# Patient Record
Sex: FEMALE | Race: WHITE | NOT HISPANIC OR LATINO | Employment: OTHER | URBAN - METROPOLITAN AREA
[De-identification: names, ages, dates, MRNs, and addresses within clinical notes are randomized per-mention and may not be internally consistent; named-entity substitution may affect disease eponyms.]

---

## 2017-01-06 ENCOUNTER — TELEPHONE (OUTPATIENT)
Dept: FAMILY MEDICINE | Facility: CLINIC | Age: 57
End: 2017-01-06

## 2017-01-06 DIAGNOSIS — N30.00 ACUTE CYSTITIS WITHOUT HEMATURIA: Primary | ICD-10-CM

## 2017-01-06 RX ORDER — SULFAMETHOXAZOLE/TRIMETHOPRIM 800-160 MG
1 TABLET ORAL 2 TIMES DAILY
Qty: 14 TABLET | Refills: 0 | Status: SHIPPED | OUTPATIENT
Start: 2017-01-06 | End: 2017-07-05

## 2017-01-06 NOTE — TELEPHONE ENCOUNTER
RN notified patient of the provider's message as it's written below.  Patient agrees and verbalized understanding.     Aleah HIDALGO RN, BSN

## 2017-01-06 NOTE — TELEPHONE ENCOUNTER
Start 7 day course of Bactrim. If symptoms do not improve or return after this course of medicine, then she will need to be seen in clinic again.    Ester Greer, CNP

## 2017-01-06 NOTE — TELEPHONE ENCOUNTER
RN spoke with patient and states she completed her antibiotic for the 3 day course and felt better for couple of days and now her symptoms are back.  Patient c/o burning and frequency and would like to get another round course of antibiotic.  Denies of blood in the urine, pelvic pain, fever, weakness, or dizziness.    Aleah HIDALGO RN, BSN

## 2017-01-06 NOTE — TELEPHONE ENCOUNTER
Reason for Call:  Medication or medication refill:    Name of the pharmacy and phone number for the current request:  [id number] - [place], [place] - [address]    Name of the medication requested: Patient doesn't remember the name.    Other request: Patient was seen on 12-21, still having symptoms and would like a refill. Was given 2 Rx's. Patient doesn't remember the names of Rx.  Can we leave a detailed message on this number? YES    Phone number patient can be reached at: Cell number on file:    Telephone Information:   Mobile [phone]       Best Time: This AM.    Call taken on 1/6/2017 at 10:06 AM by Steffanie Singh

## 2017-02-06 ENCOUNTER — TELEPHONE (OUTPATIENT)
Dept: FAMILY MEDICINE | Facility: CLINIC | Age: 57
End: 2017-02-06

## 2017-02-06 NOTE — TELEPHONE ENCOUNTER
Should follow up with ENT (Dr. Olsen) for this complaint. If she does not wish to do this, then needs OV.    Ester Greer CNP

## 2017-02-06 NOTE — TELEPHONE ENCOUNTER
Reason for Call: Request for an order or referral:    Order or referral being requested: Patient requesting referral to the [facility] for Burning mouth syndrome.  Per patient the burning is persistent     Date needed: as soon as possible    Has the patient been seen by the PCP for this problem? YES    Additional comments: Na    Phone number Patient can be reached at:  Cell number on file:    Telephone Information:   Mobile [phone]       Best Time:  anytime    Can we leave a detailed message on this number?  YES    Call taken on 2/6/2017 at 1:32 PM by Orin Couch

## 2017-03-24 NOTE — PROGRESS NOTES
SUBJECTIVE:                                                    Amalia Miranda is a 57 year old female who presents to clinic today for the following health issues:    URINARY TRACT SYMPTOMS     Onset: 1 month getting worse     Description:   Painful urination (Dysuria): YES  Blood in urine (Hematuria): no   Delay in urine (Hesitency): no     Intensity: severe    Progression of Symptoms:  worsening    Accompanying Signs & Symptoms:  Fever/chills: YES  Flank pain not sure   Nausea and vomiting: YES- nausea   Any vaginal symptoms: vaginal odor  Abdominal/Pelvic Pain: YES   History:   History of frequent UTI's: YES  History of kidney stones: no   Sexually Active: no   Possibility of pregnancy: No    Precipitating factors:            Therapies Tried and outcome: Increase fluid intake          Anxiety Follow-Up    Status since last visit: Worsened since stopping Lexapro    Other associated symptoms:None    Complicating factors:   Significant life event: Yes-  Daughter is incarcerated   Current substance abuse: None  Depression symptoms: No  COREY-7 SCORE 12/16/2011 6/16/2016 7/6/2016   Total Score 4 - -   Total Score - 0 0        GAD7     Patient would like to restart Lexapro. She felt like her anxiety was under much better control while on the medication - better able to handle life stressors. Her daughter was recently incarcerated and will be moving back in with them soon, and this has been a trigger for her. No anhedonia or low mood. No SI or thoughts of self harm.     Problem list and histories reviewed & adjusted, as indicated.  Additional history: as documented    Patient Active Problem List   Diagnosis     CARDIOVASCULAR SCREENING; LDL GOAL LESS THAN 160     Dizziness     IBS (irritable bowel syndrome)     Panic attack     Anxiety     Benzodiazepine dependence (H)     OCD (obsessive compulsive disorder)     Panic disorder with agoraphobia     Major depression in complete remission (H)     Knee pain     Health Care  Home     Distal radius fracture     Hashimoto's thyroiditis     Goiter, nontoxic, multinodular     ACP (advance care planning)     S/p bilateral carpal tunnel release     Acute right-sided low back pain with right-sided sciatica     Past Surgical History:   Procedure Laterality Date     CLOSED REDUCTION, PERCUTANEOUS PINNING WRIST, COMBINED  2/4/13    left     COLONOSCOPY  1999     ENT SURGERY  9/13/16    Lower lip bx. Dr. Olsen     RELEASE CARPAL TUNNEL Left 12/10/2015    Procedure: RELEASE CARPAL TUNNEL;  Surgeon: Elmer Cummins MD;  Location: [facility]     RELEASE CARPAL TUNNEL Right 12/31/2015    Procedure: RELEASE CARPAL TUNNEL;  Surgeon: Elmer Cummins MD;  Location: [facility] OR     SEPTOPLASTY  06/05/2000     THYROIDECTOMY  AGE 13    PARTIAL       Social History   Substance Use Topics     Smoking status: Never Smoker     Smokeless tobacco: Never Used     Alcohol use No     Family History   Problem Relation Age of Onset     Hypertension Mother      Alcohol/Drug Mother      Arthritis Mother      Depression Mother      Dementia Mother      DIABETES Father      DIABETES Maternal Grandmother      Arthritis Maternal Grandmother      Unknown/Adopted Maternal Grandfather      Unknown/Adopted Paternal Grandmother      Unknown/Adopted Paternal Grandfather      Hypertension Sister      Allergies Sister      Allergies Son      Asthma Daughter      Allergies Daughter      Respiratory Daughter      Depression Sister      Gynecology Sister      Musculoskeletal Disorder Sister      Psychotic Disorder Sister      Thyroid Disease Sister            Reviewed and updated as needed this visit by clinical staff  Tobacco  Allergies  Med Hx  Surg Hx  Fam Hx  Soc Hx      Reviewed and updated as needed this visit by Provider         ROS:  Constitutional, HEENT, cardiovascular, pulmonary, gi and gu systems are negative, except as otherwise noted.    OBJECTIVE:                                                    /64 (BP Location:  "Left arm, Patient Position: Chair, Cuff Size: Adult Regular)  Pulse 81  Temp 98.3  F (36.8  C) (Oral)  Ht 5' 7\" (1.702 m)  Wt 126 lb (57.2 kg)  SpO2 97%  Breastfeeding? No  BMI 19.73 kg/m2  Body mass index is 19.73 kg/(m^2).  GENERAL: healthy, alert and no distress  NECK: no adenopathy, no asymmetry, masses, or scars and thyroid normal to palpation  RESP: lungs clear to auscultation - no rales, rhonchi or wheezes  CV: regular rate and rhythm, normal S1 S2, no S3 or S4, no murmur, click or rub, no peripheral edema and peripheral pulses strong  ABDOMEN: soft, nontender, no hepatosplenomegaly, no masses and bowel sounds normal  MS: no gross musculoskeletal defects noted, no edema  BACK: no CVA tenderness, no paralumbar tenderness  PSYCH: mentation appears normal, affect normal/bright, judgement and insight intact, appearance well groomed and good eye contact, no psychomotor agitation or retardation     Diagnostic Test Results:  Results for orders placed or performed in visit on 03/28/17   UA reflex to Microscopic and Culture   Result Value Ref Range    Color Urine Yellow     Appearance Urine Slightly Cloudy     Glucose Urine Negative NEG mg/dL    Bilirubin Urine Negative NEG    Ketones Urine Negative NEG mg/dL    Specific Gravity Urine >1.030 1.003 - 1.035    Blood Urine Trace (A) NEG    pH Urine 5.5 5.0 - 7.0 pH    Protein Albumin Urine Negative NEG mg/dL    Urobilinogen Urine 0.2 0.2 - 1.0 EU/dL    Nitrite Urine Negative NEG    Leukocyte Esterase Urine Small (A) NEG    Source Midstream Urine    Urine Microscopic   Result Value Ref Range    WBC Urine 25-50 (A) 0 - 2 /HPF    RBC Urine 10-25 (A) 0 - 2 /HPF    Squamous Epithelial /LPF Urine Few FEW /LPF    Bacteria Urine Moderate (A) NEG /HPF    Mucous Urine Present (A) NEG /LPF   Urine Culture Aerobic Bacterial   Result Value Ref Range    Specimen Description Midstream Urine     Culture Micro (A)      >100,000 colonies/mL Non lactose fermenting gram negative " rods  10,000 to 50,000 colonies/mL urogenital radha      Micro Report Status Pending         ASSESSMENT/PLAN:                                                        ICD-10-CM    1. COREY (generalized anxiety disorder) F41.1 escitalopram (LEXAPRO) 10 MG tablet   2. Acute cystitis with hematuria N30.01 sulfamethoxazole-trimethoprim (BACTRIM DS/SEPTRA DS) 800-160 MG per tablet   3. Dysuria R30.0 UA reflex to Microscopic and Culture     Urine Microscopic   4. Nonspecific finding on examination of urine R82.90 Urine Culture Aerobic Bacterial     Will treat for UTI with Bactrim x 7 days.   Needs pelvic exam to rule out vaginal atrophy, will bring her back for this in 2 weeks.     Will restart Lexapro for anxiety since she did well with this in the past. Discussed psych ED resources at [facility] if she worsens or needs help acutely. Otherwise will see back in 2 weeks for a med check.     >50% of the visit spent in counseling regarding differential, medication side effects, and coordination of care. Visit length 28 minutes.      See Patient Instructions    Lauren A. Engelmann, MD  [facility]

## 2017-03-28 ENCOUNTER — OFFICE VISIT (OUTPATIENT)
Dept: FAMILY MEDICINE | Facility: CLINIC | Age: 57
End: 2017-03-28
Payer: COMMERCIAL

## 2017-03-28 VITALS
WEIGHT: 126 LBS | HEART RATE: 81 BPM | BODY MASS INDEX: 19.78 KG/M2 | TEMPERATURE: 98.3 F | DIASTOLIC BLOOD PRESSURE: 64 MMHG | SYSTOLIC BLOOD PRESSURE: 101 MMHG | OXYGEN SATURATION: 97 % | HEIGHT: 67 IN

## 2017-03-28 DIAGNOSIS — F41.1 GAD (GENERALIZED ANXIETY DISORDER): Primary | ICD-10-CM

## 2017-03-28 DIAGNOSIS — R82.90 NONSPECIFIC FINDING ON EXAMINATION OF URINE: ICD-10-CM

## 2017-03-28 DIAGNOSIS — R30.0 DYSURIA: ICD-10-CM

## 2017-03-28 DIAGNOSIS — N30.01 ACUTE CYSTITIS WITH HEMATURIA: ICD-10-CM

## 2017-03-28 LAB
ALBUMIN UR-MCNC: NEGATIVE MG/DL
APPEARANCE UR: ABNORMAL
BACTERIA #/AREA URNS HPF: ABNORMAL /HPF
BILIRUB UR QL STRIP: NEGATIVE
COLOR UR AUTO: YELLOW
GLUCOSE UR STRIP-MCNC: NEGATIVE MG/DL
HGB UR QL STRIP: ABNORMAL
KETONES UR STRIP-MCNC: NEGATIVE MG/DL
LEUKOCYTE ESTERASE UR QL STRIP: ABNORMAL
MUCOUS THREADS #/AREA URNS LPF: PRESENT /LPF
NITRATE UR QL: NEGATIVE
NON-SQ EPI CELLS #/AREA URNS LPF: ABNORMAL /LPF
PH UR STRIP: 5.5 PH (ref 5–7)
RBC #/AREA URNS AUTO: ABNORMAL /HPF (ref 0–2)
SP GR UR STRIP: >1.03 (ref 1–1.03)
URN SPEC COLLECT METH UR: ABNORMAL
UROBILINOGEN UR STRIP-ACNC: 0.2 EU/DL (ref 0.2–1)
WBC #/AREA URNS AUTO: ABNORMAL /HPF (ref 0–2)

## 2017-03-28 PROCEDURE — 87088 URINE BACTERIA CULTURE: CPT | Performed by: FAMILY MEDICINE

## 2017-03-28 PROCEDURE — 81001 URINALYSIS AUTO W/SCOPE: CPT | Performed by: FAMILY MEDICINE

## 2017-03-28 PROCEDURE — 87186 SC STD MICRODIL/AGAR DIL: CPT | Performed by: FAMILY MEDICINE

## 2017-03-28 PROCEDURE — 87086 URINE CULTURE/COLONY COUNT: CPT | Performed by: FAMILY MEDICINE

## 2017-03-28 PROCEDURE — 99214 OFFICE O/P EST MOD 30 MIN: CPT | Performed by: FAMILY MEDICINE

## 2017-03-28 RX ORDER — ESCITALOPRAM OXALATE 10 MG/1
10 TABLET ORAL DAILY
Qty: 30 TABLET | Refills: 1 | Status: SHIPPED | OUTPATIENT
Start: 2017-03-28 | End: 2017-08-06

## 2017-03-28 RX ORDER — SULFAMETHOXAZOLE/TRIMETHOPRIM 800-160 MG
1 TABLET ORAL 2 TIMES DAILY
Qty: 14 TABLET | Refills: 0 | Status: SHIPPED | OUTPATIENT
Start: 2017-03-28 | End: 2017-07-05

## 2017-03-28 ASSESSMENT — PAIN SCALES - GENERAL: PAINLEVEL: EXTREME PAIN (8)

## 2017-03-28 NOTE — NURSING NOTE
"Chief Complaint   Patient presents with     UTI       Initial /64 (BP Location: Left arm, Patient Position: Chair, Cuff Size: Adult Regular)  Pulse 81  Temp 98.3  F (36.8  C) (Oral)  Ht 5' 7\" (1.702 m)  Wt 126 lb (57.2 kg)  SpO2 97%  Breastfeeding? No  BMI 19.73 kg/m2 Estimated body mass index is 19.73 kg/(m^2) as calculated from the following:    Height as of this encounter: 5' 7\" (1.702 m).    Weight as of this encounter: 126 lb (57.2 kg).  Medication Reconciliation: complete   Soo Boyle CMA  "

## 2017-03-28 NOTE — PATIENT INSTRUCTIONS
I will see you in 2 weeks for a follow-up.   Please call if you have any questions in the mean time.

## 2017-03-28 NOTE — MR AVS SNAPSHOT
"              After Visit Summary   3/28/2017    Amalia Miranda    MRN: [id number]           Patient Information     Date Of Birth          1960        Visit Information        Provider Department      3/28/2017 2:40 PM Engelmann, Lauren Anneliese, MD [facility]        Today's Diagnoses     COREY (generalized anxiety disorder)    -  1    Acute cystitis with hematuria        Dysuria        Nonspecific finding on examination of urine          Care Instructions    I will see you in 2 weeks for a follow-up.   Please call if you have any questions in the mean time.        Follow-ups after your visit        Who to contact     If you have questions or need follow up information about today's clinic visit or your schedule please contact [facility] directly at [phone].  Normal or non-critical lab and imaging results will be communicated to you by MyChart, letter or phone within 4 business days after the clinic has received the results. If you do not hear from us within 7 days, please contact the clinic through MyChart or phone. If you have a critical or abnormal lab result, we will notify you by phone as soon as possible.  Submit refill requests through "[employer] or call your pharmacy and they will forward the refill request to us. Please allow 3 business days for your refill to be completed.          Additional Information About Your Visit        [email or web address] Information     "[employer] lets you send messages to your doctor, view your test results, renew your prescriptions, schedule appointments and more. To sign up, go to [email or web address] . Click on \"Log in\" on the left side of the screen, which will take you to the Welcome page. Then click on \"Sign up Now\" on the right side of the page.     You will be asked to enter the access code listed below, as well as some personal information. Please follow the directions to create your username and password.     Your access code " "is: [email or web address]  Expires: 2017  3:13 PM     Your access code will  in 90 days. If you need help or a new code, please call your [place] clinic or [phone].        Care EveryWhere ID     This is your Care EveryWhere ID. This could be used by other organizations to access your [place] medical records  [id number]        Your Vitals Were     Pulse Temperature Height Pulse Oximetry Breastfeeding? BMI (Body Mass Index)    81 98.3  F (36.8  C) (Oral) 5' 7\" (1.702 m) 97% No 19.73 kg/m2       Blood Pressure from Last 3 Encounters:   17 101/64   16 105/64   10/15/16 105/70    Weight from Last 3 Encounters:   17 126 lb (57.2 kg)   16 119 lb (54 kg)   10/15/16 113 lb 2 oz (51.3 kg)              We Performed the Following     UA reflex to Microscopic and Culture     Urine Culture Aerobic Bacterial     Urine Microscopic          Today's Medication Changes          These changes are accurate as of: 3/28/17  3:13 PM.  If you have any questions, ask your nurse or doctor.               Start taking these medicines.        Dose/Directions    escitalopram 10 MG tablet   Commonly known as:  LEXAPRO   Used for:  COREY (generalized anxiety disorder)   Started by:  Engelmann, Lauren Anneliese, MD        Dose:  10 mg   Take 1 tablet (10 mg) by mouth daily   Quantity:  30 tablet   Refills:  1         These medicines have changed or have updated prescriptions.        Dose/Directions    * sulfamethoxazole-trimethoprim 800-160 MG per tablet   Commonly known as:  BACTRIM DS/SEPTRA DS   This may have changed:  Another medication with the same name was added. Make sure you understand how and when to take each.   Used for:  Acute cystitis without hematuria   Changed by:  Ester Greer APRN CNP        Dose:  1 tablet   Take 1 tablet by mouth 2 times daily   Quantity:  14 tablet   Refills:  0       * sulfamethoxazole-trimethoprim 800-160 MG per tablet   Commonly known as:  BACTRIM DS/SEPTRA DS   This " may have changed:  You were already taking a medication with the same name, and this prescription was added. Make sure you understand how and when to take each.   Used for:  Acute cystitis with hematuria   Changed by:  Engelmann, Lauren Anneliese, MD        Dose:  1 tablet   Take 1 tablet by mouth 2 times daily   Quantity:  14 tablet   Refills:  0       * Notice:  This list has 2 medication(s) that are the same as other medications prescribed for you. Read the directions carefully, and ask your doctor or other care provider to review them with you.         Where to get your medicines      These medications were sent to [id number] - [place], [place] - [address]  [address] [address]     Phone:  [phone]     escitalopram 10 MG tablet    sulfamethoxazole-trimethoprim 800-160 MG per tablet                Primary Care Provider Office Phone # Fax #    Olvin Delvalle [phone]       [facility] [address] [facility] [address]        Thank you!     Thank you for choosing [facility]  for your care. Our goal is always to provide you with excellent care. Hearing back from our patients is one way we can continue to improve our services. Please take a few minutes to complete the written survey that you may receive in the mail after your visit with us. Thank you!             Your Updated Medication List - Protect others around you: Learn how to safely use, store and throw away your medicines at www.disposemymeds.org.          This list is accurate as of: 3/28/17  3:13 PM.  Always use your most recent med list.                   Brand Name Dispense Instructions for use    CALTRATE 600 PO      twice daily       escitalopram 10 MG tablet    LEXAPRO    30 tablet    Take 1 tablet (10 mg) by mouth daily       EXCEDRIN PO      as needed       IMODIUM A-D 2 MG Chew   Generic drug:  Loperamide HCl      every day        metroNIDAZOLE 500 MG tablet    FLAGYL    14 tablet    Take 1 tablet (500 mg) by mouth 2 times daily       omeprazole 20 MG CR capsule    priLOSEC     Take 20 mg by mouth daily       * sulfamethoxazole-trimethoprim 800-160 MG per tablet    BACTRIM DS/SEPTRA DS    14 tablet    Take 1 tablet by mouth 2 times daily       * sulfamethoxazole-trimethoprim 800-160 MG per tablet    BACTRIM DS/SEPTRA DS    14 tablet    Take 1 tablet by mouth 2 times daily       vitamin D 400 UNITS capsule      2 a day       * Notice:  This list has 2 medication(s) that are the same as other medications prescribed for you. Read the directions carefully, and ask your doctor or other care provider to review them with you.

## 2017-03-29 NOTE — PROGRESS NOTES
Results discussed in clinic. All questions already answered, but encouraged patient to call if any others arise.

## 2017-03-30 LAB
BACTERIA SPEC CULT: ABNORMAL
MICRO REPORT STATUS: ABNORMAL
MICROORGANISM SPEC CULT: ABNORMAL
SPECIMEN SOURCE: ABNORMAL

## 2017-03-30 NOTE — PROGRESS NOTES
On appropriate antibiotic regimen (TMP-SMX), labs otherwise reviewed with patient.     Lauren Engelmann, MD

## 2017-04-03 NOTE — PROGRESS NOTES
SUBJECTIVE:                                                    Amalia Miranda is a 57 year old female who presents to clinic today for the following health issues:      Concern - UTI FOLLOW UP     Onset: Ongoing since end of March    Description:   Abdominal and back pain, still burning while urinating and vaginal itching on the outside    Intensity: Moderate burning    Progression of Symptoms:  same    Accompanying Signs & Symptoms:  Dizzy, nausea, no appetite       Previous history of similar problem:   Yes    Precipitating factors:   Worsened by: Nothing    Alleviating factors:  Improved by: Nothing       Therapies Tried and outcome: Finished course of Bactrim, initially felt better. Reported worsening of symptoms and was put on Macrobid, but she only tolerated 2 doses of it so she stopped - it caused dizziness, nausea.     Reports high level of anxiety lately due to daughter's impending release from [facility] to her home.     Problem list and histories reviewed & adjusted, as indicated.  Additional history: as documented    Patient Active Problem List   Diagnosis     CARDIOVASCULAR SCREENING; LDL GOAL LESS THAN 160     Dizziness     IBS (irritable bowel syndrome)     Panic attack     Anxiety     Benzodiazepine dependence (H)     OCD (obsessive compulsive disorder)     Panic disorder with agoraphobia     Major depression in complete remission (H)     Knee pain     Health Care Home     Distal radius fracture     Hashimoto's thyroiditis     Goiter, nontoxic, multinodular     ACP (advance care planning)     S/p bilateral carpal tunnel release     Acute right-sided low back pain with right-sided sciatica     Menopausal vaginal dryness     Past Surgical History:   Procedure Laterality Date     CLOSED REDUCTION, PERCUTANEOUS PINNING WRIST, COMBINED  2/4/13    left     COLONOSCOPY  1999     ENT SURGERY  9/13/16    Lower lip bx. Dr. Olsen     RELEASE CARPAL TUNNEL Left 12/10/2015    Procedure: RELEASE CARPAL TUNNEL;   Surgeon: Elmer Cummins MD;  Location: [facility] OR     RELEASE CARPAL TUNNEL Right 12/31/2015    Procedure: RELEASE CARPAL TUNNEL;  Surgeon: Elmer Cummins MD;  Location: [facility] OR     SEPTOPLASTY  06/05/2000     THYROIDECTOMY  AGE 13    PARTIAL       Social History   Substance Use Topics     Smoking status: Never Smoker     Smokeless tobacco: Never Used     Alcohol use No     Family History   Problem Relation Age of Onset     Hypertension Mother      Alcohol/Drug Mother      Arthritis Mother      Depression Mother      Dementia Mother      DIABETES Father      DIABETES Maternal Grandmother      Arthritis Maternal Grandmother      Unknown/Adopted Maternal Grandfather      Unknown/Adopted Paternal Grandmother      Unknown/Adopted Paternal Grandfather      Hypertension Sister      Allergies Sister      Allergies Son      Asthma Daughter      Allergies Daughter      Respiratory Daughter      Depression Sister      Gynecology Sister      Musculoskeletal Disorder Sister      Psychotic Disorder Sister      Thyroid Disease Sister            Reviewed and updated as needed this visit by clinical staff  Tobacco  Allergies  Meds  Med Hx  Surg Hx  Fam Hx  Soc Hx      Reviewed and updated as needed this visit by Provider         ROS:  Constitutional, HEENT, cardiovascular, pulmonary, gi and gu systems are negative, except as otherwise noted.    OBJECTIVE:                                                    /66 (BP Location: Right arm, Patient Position: Chair, Cuff Size: Adult Regular)  Pulse 81  Temp 97.7  F (36.5  C) (Oral)  Wt 123 lb (55.8 kg)  SpO2 100%  BMI 19.26 kg/m2  Body mass index is 19.26 kg/(m^2).  GENERAL: healthy, alert and no distress  NECK: no adenopathy, no asymmetry, masses, or scars and thyroid normal to palpation  RESP: lungs clear to auscultation - no rales, rhonchi or wheezes  CV: regular rate and rhythm, normal S1 S2, no S3 or S4, no murmur, click or rub, no peripheral edema and peripheral  pulses strong  ABDOMEN: soft, nontender, no hepatosplenomegaly, no masses and bowel sounds normal   (female): normal female external genitalia, normal urethral meatus , vaginal mucosal atrophy and normal cervix, adnexae, and uterus without masses.  MS: no gross musculoskeletal defects noted, no edema  BACK: no CVA tenderness, no paralumbar tenderness    Diagnostic Test Results:  Results for orders placed or performed in visit on 04/11/17   UA reflex to Microscopic and Culture   Result Value Ref Range    Color Urine Yellow     Appearance Urine Clear     Glucose Urine Negative NEG mg/dL    Bilirubin Urine (A) NEG     Small  This is an unconfirmed screening test result. A positive result may be false.      Ketones Urine Negative NEG mg/dL    Specific Gravity Urine >1.030 1.003 - 1.035    Blood Urine Negative NEG    pH Urine 5.5 5.0 - 7.0 pH    Protein Albumin Urine Trace (A) NEG mg/dL    Urobilinogen Urine 1.0 0.2 - 1.0 EU/dL    Nitrite Urine Negative NEG    Leukocyte Esterase Urine Negative NEG    Source Midstream Urine    Urine Microscopic   Result Value Ref Range    WBC Urine O - 2 0 - 2 /HPF    RBC Urine O - 2 0 - 2 /HPF    Bacteria Urine Few (A) NEG /HPF    Calcium Oxalate Few (A) NEG /HPF   Wet prep   Result Value Ref Range    Specimen Description Vagina     Wet Prep       No Trichomonas seen  No clue cells seen  No yeast seen      Micro Report Status FINAL 04/11/2017         ASSESSMENT/PLAN:                                                        ICD-10-CM    1. Dysuria R30.0 UA reflex to Microscopic and Culture     Urine Microscopic   2. Vaginal itching L29.8 Wet prep     conjugated estrogens (PREMARIN) cream   3. Menopausal vaginal dryness N95.1      UA is clean - no evidence of recurrent UTI.   Unremarkable wet prep.   Suspect vaginal atrophy/dryness is contributing to symptoms. We will trial Premarin cream to be used weekly. Follow up if not improving in 2-3 weeks, or in 6 months if doing well for a  recheck.     See Patient Instructions    Lauren A. Engelmann, MD  [facility]

## 2017-04-06 ENCOUNTER — TELEPHONE (OUTPATIENT)
Dept: FAMILY MEDICINE | Facility: CLINIC | Age: 57
End: 2017-04-06

## 2017-04-06 DIAGNOSIS — R30.0 DYSURIA: Primary | ICD-10-CM

## 2017-04-06 RX ORDER — NITROFURANTOIN 25; 75 MG/1; MG/1
100 CAPSULE ORAL 2 TIMES DAILY
Qty: 14 CAPSULE | Refills: 0 | Status: SHIPPED | OUTPATIENT
Start: 2017-04-06 | End: 2017-07-05

## 2017-04-06 NOTE — TELEPHONE ENCOUNTER
Red flag call taken, patient was diagnosed with UTI 3/28/17 and treated, UC sensitivity done, provider felt the Rx sent should cover (Bactrim DS).  Patient states she did feel like she was improving a bit on the med, finished 2 days ago and is again having low abdominal pain, pain with urination.  Denies fever, chills, blood in urine.  Routed to provider (Engelmann to address, possible new Rx?).  Pharmacy selected.  Dr. Engelmann has left for the day.  Routed to Dr. Villatoro to address in her absence.  Basia Goyal, IMANI  [facility]

## 2017-04-06 NOTE — TELEPHONE ENCOUNTER
I called patient and advised her of new Rx sent.  She will seen Dr. Engelmann Tuesdays for follow up already scheduled.  Advised her to call if worsening symptoms such as fever, chills, blood in urine.  Patient verbalized understanding of and agreement with plan.  Basia Goyal RN  [facility]

## 2017-04-06 NOTE — TELEPHONE ENCOUNTER
Reason for call:  Patient reporting a symptom    Symptom or request: abdominal pain    Duration (how long have symptoms been present): was being treated for UTI, not getting better    Have you been treated for this before? Yes    Additional comments: IMANI [place] triaging patient.        Call taken on 4/6/2017 at 4:50 PM by Steffanie Singh

## 2017-04-11 ENCOUNTER — OFFICE VISIT (OUTPATIENT)
Dept: FAMILY MEDICINE | Facility: CLINIC | Age: 57
End: 2017-04-11
Payer: COMMERCIAL

## 2017-04-11 VITALS
WEIGHT: 123 LBS | OXYGEN SATURATION: 100 % | BODY MASS INDEX: 19.26 KG/M2 | TEMPERATURE: 97.7 F | HEART RATE: 81 BPM | SYSTOLIC BLOOD PRESSURE: 114 MMHG | DIASTOLIC BLOOD PRESSURE: 66 MMHG

## 2017-04-11 DIAGNOSIS — N89.8 VAGINAL ITCHING: ICD-10-CM

## 2017-04-11 DIAGNOSIS — N95.1 MENOPAUSAL VAGINAL DRYNESS: ICD-10-CM

## 2017-04-11 DIAGNOSIS — R30.0 DYSURIA: Primary | ICD-10-CM

## 2017-04-11 LAB
ALBUMIN UR-MCNC: ABNORMAL MG/DL
APPEARANCE UR: CLEAR
BACTERIA #/AREA URNS HPF: ABNORMAL /HPF
BILIRUB UR QL STRIP: ABNORMAL
CAOX CRY #/AREA URNS HPF: ABNORMAL /HPF
COLOR UR AUTO: YELLOW
GLUCOSE UR STRIP-MCNC: NEGATIVE MG/DL
HGB UR QL STRIP: NEGATIVE
KETONES UR STRIP-MCNC: NEGATIVE MG/DL
LEUKOCYTE ESTERASE UR QL STRIP: NEGATIVE
MICRO REPORT STATUS: NORMAL
NITRATE UR QL: NEGATIVE
PH UR STRIP: 5.5 PH (ref 5–7)
RBC #/AREA URNS AUTO: ABNORMAL /HPF (ref 0–2)
SP GR UR STRIP: >1.03 (ref 1–1.03)
SPECIMEN SOURCE: NORMAL
URN SPEC COLLECT METH UR: ABNORMAL
UROBILINOGEN UR STRIP-ACNC: 1 EU/DL (ref 0.2–1)
WBC #/AREA URNS AUTO: ABNORMAL /HPF (ref 0–2)
WET PREP SPEC: NORMAL

## 2017-04-11 PROCEDURE — 87210 SMEAR WET MOUNT SALINE/INK: CPT | Performed by: FAMILY MEDICINE

## 2017-04-11 PROCEDURE — 81001 URINALYSIS AUTO W/SCOPE: CPT | Performed by: FAMILY MEDICINE

## 2017-04-11 PROCEDURE — 99213 OFFICE O/P EST LOW 20 MIN: CPT | Performed by: FAMILY MEDICINE

## 2017-04-11 ASSESSMENT — PAIN SCALES - GENERAL: PAINLEVEL: SEVERE PAIN (6)

## 2017-04-11 NOTE — NURSING NOTE
"Chief Complaint   Patient presents with     RECHECK     other     Due for PHQ9       Initial /66 (BP Location: Right arm, Patient Position: Chair, Cuff Size: Adult Regular)  Pulse 81  Temp 97.7  F (36.5  C) (Oral)  Wt 123 lb (55.8 kg)  SpO2 100%  BMI 19.26 kg/m2 Estimated body mass index is 19.26 kg/(m^2) as calculated from the following:    Height as of 3/28/17: 5' 7\" (1.702 m).    Weight as of this encounter: 123 lb (55.8 kg).  Medication Reconciliation: complete   Bhumi Tillman MA      "

## 2017-04-11 NOTE — MR AVS SNAPSHOT
"              After Visit Summary   2017    Amalia Miranda    MRN: [id number]           Patient Information     Date Of Birth          1960        Visit Information        Provider Department      2017 3:20 PM Engelmann, Lauren Anneliese, MD [facility]        Today's Diagnoses     Dysuria    -  1    Vaginal itching          Care Instructions    Call the clinic if you have any issues with the cream.         Follow-ups after your visit        Who to contact     If you have questions or need follow up information about today's clinic visit or your schedule please contact [facility] directly at [phone].  Normal or non-critical lab and imaging results will be communicated to you by MyChart, letter or phone within 4 business days after the clinic has received the results. If you do not hear from us within 7 days, please contact the clinic through MyChart or phone. If you have a critical or abnormal lab result, we will notify you by phone as soon as possible.  Submit refill requests through [employer] or call your pharmacy and they will forward the refill request to us. Please allow 3 business days for your refill to be completed.          Additional Information About Your Visit        MyChart Information     [employer] lets you send messages to your doctor, view your test results, renew your prescriptions, schedule appointments and more. To sign up, go to [email or web address] . Click on \"Log in\" on the left side of the screen, which will take you to the Welcome page. Then click on \"Sign up Now\" on the right side of the page.     You will be asked to enter the access code listed below, as well as some personal information. Please follow the directions to create your username and password.     Your access code is: [email or web address]  Expires: 2017  3:13 PM     Your access code will  in 90 days. If you need help or a new code, please call your [place] " clinic or [phone].        Care EveryWhere ID     This is your Care EveryWhere ID. This could be used by other organizations to access your [place] medical records  [id number]        Your Vitals Were     Pulse Temperature Pulse Oximetry BMI (Body Mass Index)          81 97.7  F (36.5  C) (Oral) 100% 19.26 kg/m2         Blood Pressure from Last 3 Encounters:   04/11/17 114/66   03/28/17 101/64   12/21/16 105/64    Weight from Last 3 Encounters:   04/11/17 123 lb (55.8 kg)   03/28/17 126 lb (57.2 kg)   12/21/16 119 lb (54 kg)              We Performed the Following     UA reflex to Microscopic and Culture     Urine Microscopic     Wet prep          Today's Medication Changes          These changes are accurate as of: 4/11/17  4:10 PM.  If you have any questions, ask your nurse or doctor.               Start taking these medicines.        Dose/Directions    conjugated estrogens cream   Commonly known as:  PREMARIN   Used for:  Vaginal itching   Started by:  Engelmann, Lauren Anneliese, MD        Dose:  0.5 g   Start taking on:  4/13/2017   Place 0.5 g vaginally twice a week   Quantity:  30 g   Refills:  12            Where to get your medicines      These medications were sent to [facility]/pharmacy #[id number] - [place], [place] [address] [address]     Phone:  [phone]     conjugated estrogens cream                Primary Care Provider Office Phone # Fax #    Olvin Delvalle [phone]       [address] [facility] [address]        Thank you!     Thank you for choosing [facility]  for your care. Our goal is always to provide you with excellent care. Hearing back from our patients is one way we can continue to improve our services. Please take a few minutes to complete the written survey that you may receive in the mail after your visit with us. Thank you!             Your Updated Medication List -  Protect others around you: Learn how to safely use, store and throw away your medicines at www.disposemymeds.org.          This list is accurate as of: 4/11/17  4:10 PM.  Always use your most recent med list.                   Brand Name Dispense Instructions for use    CALTRATE 600 PO      twice daily       conjugated estrogens cream   Start taking on:  4/13/2017    PREMARIN    30 g    Place 0.5 g vaginally twice a week       escitalopram 10 MG tablet    LEXAPRO    30 tablet    Take 1 tablet (10 mg) by mouth daily       EXCEDRIN PO      as needed       IMODIUM A-D 2 MG Chew   Generic drug:  Loperamide HCl      every day       metroNIDAZOLE 500 MG tablet    FLAGYL    14 tablet    Take 1 tablet (500 mg) by mouth 2 times daily       nitrofurantoin (macrocrystal-monohydrate) 100 MG capsule    MACROBID    14 capsule    Take 1 capsule (100 mg) by mouth 2 times daily       omeprazole 20 MG CR capsule    priLOSEC     Take 20 mg by mouth daily       * sulfamethoxazole-trimethoprim 800-160 MG per tablet    BACTRIM DS/SEPTRA DS    14 tablet    Take 1 tablet by mouth 2 times daily       * sulfamethoxazole-trimethoprim 800-160 MG per tablet    BACTRIM DS/SEPTRA DS    14 tablet    Take 1 tablet by mouth 2 times daily       vitamin D 400 UNITS capsule      2 a day       * Notice:  This list has 2 medication(s) that are the same as other medications prescribed for you. Read the directions carefully, and ask your doctor or other care provider to review them with you.

## 2017-04-19 PROBLEM — N95.1 MENOPAUSAL VAGINAL DRYNESS: Status: ACTIVE | Noted: 2017-04-19

## 2017-07-05 ENCOUNTER — OFFICE VISIT (OUTPATIENT)
Dept: URGENT CARE | Facility: URGENT CARE | Age: 57
End: 2017-07-05
Payer: COMMERCIAL

## 2017-07-05 VITALS
OXYGEN SATURATION: 99 % | WEIGHT: 117 LBS | BODY MASS INDEX: 18.32 KG/M2 | TEMPERATURE: 98.2 F | HEART RATE: 84 BPM | DIASTOLIC BLOOD PRESSURE: 67 MMHG | SYSTOLIC BLOOD PRESSURE: 106 MMHG

## 2017-07-05 DIAGNOSIS — N39.0 URINARY TRACT INFECTION WITH HEMATURIA, SITE UNSPECIFIED: Primary | ICD-10-CM

## 2017-07-05 DIAGNOSIS — R31.9 URINARY TRACT INFECTION WITH HEMATURIA, SITE UNSPECIFIED: Primary | ICD-10-CM

## 2017-07-05 DIAGNOSIS — R82.90 NONSPECIFIC FINDING ON EXAMINATION OF URINE: ICD-10-CM

## 2017-07-05 DIAGNOSIS — R30.0 DYSURIA: ICD-10-CM

## 2017-07-05 LAB
ALBUMIN UR-MCNC: 30 MG/DL
APPEARANCE UR: ABNORMAL
BACTERIA #/AREA URNS HPF: ABNORMAL /HPF
BILIRUB UR QL STRIP: NEGATIVE
COLOR UR AUTO: YELLOW
GLUCOSE UR STRIP-MCNC: NEGATIVE MG/DL
HGB UR QL STRIP: ABNORMAL
KETONES UR STRIP-MCNC: ABNORMAL MG/DL
LEUKOCYTE ESTERASE UR QL STRIP: ABNORMAL
MUCOUS THREADS #/AREA URNS LPF: PRESENT /LPF
NITRATE UR QL: POSITIVE
NON-SQ EPI CELLS #/AREA URNS LPF: ABNORMAL /LPF
PH UR STRIP: 6 PH (ref 5–7)
RBC #/AREA URNS AUTO: ABNORMAL /HPF (ref 0–2)
SP GR UR STRIP: 1.02 (ref 1–1.03)
URN SPEC COLLECT METH UR: ABNORMAL
UROBILINOGEN UR STRIP-ACNC: 1 EU/DL (ref 0.2–1)
WBC #/AREA URNS AUTO: ABNORMAL /HPF (ref 0–2)

## 2017-07-05 PROCEDURE — 87186 SC STD MICRODIL/AGAR DIL: CPT | Performed by: PHYSICIAN ASSISTANT

## 2017-07-05 PROCEDURE — 87088 URINE BACTERIA CULTURE: CPT | Performed by: PHYSICIAN ASSISTANT

## 2017-07-05 PROCEDURE — 87086 URINE CULTURE/COLONY COUNT: CPT | Performed by: PHYSICIAN ASSISTANT

## 2017-07-05 PROCEDURE — 99213 OFFICE O/P EST LOW 20 MIN: CPT | Performed by: PHYSICIAN ASSISTANT

## 2017-07-05 PROCEDURE — 81001 URINALYSIS AUTO W/SCOPE: CPT | Performed by: PHYSICIAN ASSISTANT

## 2017-07-05 RX ORDER — CIPROFLOXACIN 500 MG/1
500 TABLET, FILM COATED ORAL 2 TIMES DAILY
Qty: 14 TABLET | Refills: 0 | Status: SHIPPED | OUTPATIENT
Start: 2017-07-05 | End: 2017-11-20

## 2017-07-05 NOTE — MR AVS SNAPSHOT
"              After Visit Summary   2017    Amalia Miranda    MRN: [id number]           Patient Information     Date Of Birth          1960        Visit Information        Provider Department      2017 5:10 PM Sandra Gamez PA-C [facility]        Today's Diagnoses     Urinary tract infection with hematuria, site unspecified    -  1    Dysuria        Nonspecific finding on examination of urine           Follow-ups after your visit        Who to contact     If you have questions or need follow up information about today's clinic visit or your schedule please contact [facility] directly at [phone].  Normal or non-critical lab and imaging results will be communicated to you by [employer], letter or phone within 4 business days after the clinic has received the results. If you do not hear from us within 7 days, please contact the clinic through [employer] or phone. If you have a critical or abnormal lab result, we will notify you by phone as soon as possible.  Submit refill requests through [employer] or call your pharmacy and they will forward the refill request to us. Please allow 3 business days for your refill to be completed.          Additional Information About Your Visit        MyChart Information     [employer] lets you send messages to your doctor, view your test results, renew your prescriptions, schedule appointments and more. To sign up, go to [email or web address] . Click on \"Log in\" on the left side of the screen, which will take you to the Welcome page. Then click on \"Sign up Now\" on the right side of the page.     You will be asked to enter the access code listed below, as well as some personal information. Please follow the directions to create your username and password.     Your access code is: [email or web address]  Expires: 10/4/2017  5:45 AM     Your access code will  in 90 days. If you need help or a new code, please call your [place] " clinic or [phone].        Care EveryWhere ID     This is your Care EveryWhere ID. This could be used by other organizations to access your [place] medical records  [id number]        Your Vitals Were     Pulse Temperature Pulse Oximetry Breastfeeding? BMI (Body Mass Index)       84 98.2  F (36.8  C) (Oral) 99% No 18.32 kg/m2        Blood Pressure from Last 3 Encounters:   07/05/17 106/67   04/11/17 114/66   03/28/17 101/64    Weight from Last 3 Encounters:   07/05/17 117 lb (53.1 kg)   04/11/17 123 lb (55.8 kg)   03/28/17 126 lb (57.2 kg)              We Performed the Following     UA with Microscopic reflex to Culture     Urine Culture Aerobic Bacterial          Today's Medication Changes          These changes are accurate as of: 7/5/17 11:59 PM.  If you have any questions, ask your nurse or doctor.               Start taking these medicines.        Dose/Directions    ciprofloxacin 500 MG tablet   Commonly known as:  CIPRO   Used for:  Urinary tract infection with hematuria, site unspecified   Started by:  Sandra Gamez PA-C        Dose:  500 mg   Take 1 tablet (500 mg) by mouth 2 times daily   Quantity:  14 tablet   Refills:  0            Where to get your medicines      These medications were sent to [facility] - [place], [place] - [address]  [address], [facility] [address]     Phone:  [phone]     ciprofloxacin 500 MG tablet                Primary Care Provider Office Phone # Fax #    Olvin Delvalle [phone]       [facility] [address]  [facility] [address]        Equal Access to Services     [facility]: Hadii aad ku hadasho Soomaali, waaxda luqadaha, qaybta kaalmada ademarisabelyanarciso, feli pang. So [facility] [phone].    ATENCIÓN: Si habla español, tiene a sow disposición servicios gratuitos de asistencia lingüística. Llame al [phone].    We comply with applicable  federal civil rights laws and [place] laws. We do not discriminate on the basis of race, color, national origin, age, disability sex, sexual orientation or gender identity.            Thank you!     Thank you for choosing [facility]  for your care. Our goal is always to provide you with excellent care. Hearing back from our patients is one way we can continue to improve our services. Please take a few minutes to complete the written survey that you may receive in the mail after your visit with us. Thank you!             Your Updated Medication List - Protect others around you: Learn how to safely use, store and throw away your medicines at www.disposemymeds.org.          This list is accurate as of: 7/5/17 11:59 PM.  Always use your most recent med list.                   Brand Name Dispense Instructions for use Diagnosis    CALTRATE 600 PO      twice daily        ciprofloxacin 500 MG tablet    CIPRO    14 tablet    Take 1 tablet (500 mg) by mouth 2 times daily    Urinary tract infection with hematuria, site unspecified       conjugated estrogens cream    PREMARIN    30 g    Place 0.5 g vaginally twice a week    Vaginal itching       escitalopram 10 MG tablet    LEXAPRO    30 tablet    Take 1 tablet (10 mg) by mouth daily    COREY (generalized anxiety disorder)       EXCEDRIN PO      as needed        IMODIUM A-D 2 MG Chew   Generic drug:  Loperamide HCl      every day        metroNIDAZOLE 500 MG tablet    FLAGYL    14 tablet    Take 1 tablet (500 mg) by mouth 2 times daily    BV (bacterial vaginosis)       omeprazole 20 MG CR capsule    priLOSEC     Take 20 mg by mouth daily        vitamin D 400 UNITS capsule      2 a day

## 2017-07-05 NOTE — NURSING NOTE
"Chief Complaint   Patient presents with     Urinary Problem     Pt c/o urinary problem.       Initial /67 (BP Location: Left arm, Patient Position: Chair, Cuff Size: Adult Regular)  Pulse 84  Temp 98.2  F (36.8  C) (Oral)  Wt 117 lb (53.1 kg)  SpO2 99%  Breastfeeding? No  BMI 18.32 kg/m2 Estimated body mass index is 18.32 kg/(m^2) as calculated from the following:    Height as of 3/28/17: 5' 7\" (1.702 m).    Weight as of this encounter: 117 lb (53.1 kg).  Medication Reconciliation: complete     Arpita Argueta CMA (AAMA)      "

## 2017-07-05 NOTE — PROGRESS NOTES
SUBJECTIVE:                                                    Amalia Miranda is a 57 year old female who presents to clinic today for the following health issues:    URINARY TRACT SYMPTOMS      Duration: one month for cloudy urine and odor; urinary frequency, left flank pain, and bladder pressure x 2-3 days.     Description  Cloudy urine, left flank pain, urinary frequency, bladder pressure/pain, nocturia     Intensity:  moderate    Accompanying signs and symptoms: loss of appetite x 2 days  Fever/chills: YES  Flank pain YES-left  Nausea and vomiting: YES- nausea. Vomited yesterday.   Vaginal symptoms: none  Abdominal/Pelvic Pain: YES    History  History of frequent UTI's: YES  History of kidney stones: no   Sexually Active: no   Possibility of pregnancy: No    Precipitating or alleviating factors: None    Therapies tried and outcome: Azo- had one dose this morning   Outcome: little relief.    She had an illness this past weekend with fever and chills.   No fever and chills today.  Symptoms started a couple days ago      Problem list and histories reviewed & adjusted, as indicated.  Additional history: as documented    Patient Active Problem List   Diagnosis     CARDIOVASCULAR SCREENING; LDL GOAL LESS THAN 160     Dizziness     IBS (irritable bowel syndrome)     Panic attack     Anxiety     Benzodiazepine dependence (H)     OCD (obsessive compulsive disorder)     Panic disorder with agoraphobia     Major depression in complete remission (H)     Knee pain     Health Care Home     Distal radius fracture     Hashimoto's thyroiditis     Goiter, nontoxic, multinodular     ACP (advance care planning)     S/p bilateral carpal tunnel release     Acute right-sided low back pain with right-sided sciatica     Menopausal vaginal dryness     Past Surgical History:   Procedure Laterality Date     CLOSED REDUCTION, PERCUTANEOUS PINNING WRIST, COMBINED  2/4/13    left     COLONOSCOPY  1999     ENT SURGERY  9/13/16    Lower lip  bx. Dr. Olsen     RELEASE CARPAL TUNNEL Left 12/10/2015    Procedure: RELEASE CARPAL TUNNEL;  Surgeon: Elmer Cummins MD;  Location: [facility] OR     RELEASE CARPAL TUNNEL Right 12/31/2015    Procedure: RELEASE CARPAL TUNNEL;  Surgeon: Elmer Cummins MD;  Location: [facility] OR     SEPTOPLASTY  06/05/2000     THYROIDECTOMY  AGE 13    PARTIAL       Social History   Substance Use Topics     Smoking status: Never Smoker     Smokeless tobacco: Never Used     Alcohol use No     Family History   Problem Relation Age of Onset     Hypertension Mother      Alcohol/Drug Mother      Arthritis Mother      Depression Mother      Dementia Mother      DIABETES Father      DIABETES Maternal Grandmother      Arthritis Maternal Grandmother      Unknown/Adopted Maternal Grandfather      Unknown/Adopted Paternal Grandmother      Unknown/Adopted Paternal Grandfather      Hypertension Sister      Allergies Sister      Allergies Son      Asthma Daughter      Allergies Daughter      Respiratory Daughter      Depression Sister      Gynecology Sister      Musculoskeletal Disorder Sister      Psychotic Disorder Sister      Thyroid Disease Sister            ROS:  As in HPI      OBJECTIVE:     /67 (BP Location: Left arm, Patient Position: Chair, Cuff Size: Adult Regular)  Pulse 84  Temp 98.2  F (36.8  C) (Oral)  Wt 117 lb (53.1 kg)  SpO2 99%  Breastfeeding? No  BMI 18.32 kg/m2  Body mass index is 18.32 kg/(m^2).  GENERAL: healthy, alert and no distress  NECK: no adenopathy, no asymmetry  RESP: lungs clear to auscultation - no rales, rhonchi or wheezes  CV: regular rate and rhythm, normal S1 S2, no murmur  ABDOMEN: tenderness RUQ and no palpable or pulsatile masses  NEURO: Normal strength and tone, mentation intact and speech normal  BACK: mild CVA tenderness on left, no paralumbar tenderness, no spinous process tenderness    Diagnostic Test Results:  Results for orders placed or performed in visit on 07/05/17 (from the past 24 hour(s))    UA with Microscopic reflex to Culture   Result Value Ref Range    Color Urine Yellow     Appearance Urine Slightly Cloudy     Glucose Urine Negative NEG mg/dL    Bilirubin Urine Negative NEG    Ketones Urine Trace (A) NEG mg/dL    Specific Gravity Urine 1.020 1.003 - 1.035    pH Urine 6.0 5.0 - 7.0 pH    Protein Albumin Urine 30 (A) NEG mg/dL    Urobilinogen Urine 1.0 0.2 - 1.0 EU/dL    Nitrite Urine Positive (A) NEG    Blood Urine Trace (A) NEG    Leukocyte Esterase Urine Moderate (A) NEG    Source Midstream Urine     WBC Urine  (A) 0 - 2 /HPF    RBC Urine O - 2 0 - 2 /HPF    Squamous Epithelial /LPF Urine Few FEW /LPF    Bacteria Urine Moderate (A) NEG /HPF    Mucous Urine Present (A) NEG /LPF       ASSESSMENT/PLAN:     1. Urinary tract infection with hematuria, site unspecified  - UA consistent with UTI and patient has some kidney pain so we will cover for acute pyelonephritis  - ciprofloxacin (CIPRO) 500 MG tablet; Take 1 tablet (500 mg) by mouth 2 times daily  Dispense: 14 tablet; Refill: 0    2. Dysuria  - UA with Microscopic reflex to Culture    3. Nonspecific finding on examination of urine  - Urine Culture Aerobic Bacterial    Go to ED with worsening back or abdominal pain, and with fever.      Sandra Gamez PA-C  [facility]

## 2017-08-06 ENCOUNTER — TELEPHONE (OUTPATIENT)
Dept: FAMILY MEDICINE | Facility: CLINIC | Age: 57
End: 2017-08-06

## 2017-08-06 DIAGNOSIS — F41.1 GAD (GENERALIZED ANXIETY DISORDER): ICD-10-CM

## 2017-08-06 NOTE — LETTER
Amalia,    We recently refilled your Lexapro.  We have tried to call you to see how you are doing on this medication.  Please call us back so we may discuss this.    Thank-you,    Marianne Byrne RN [facility] Triage.

## 2017-08-07 NOTE — TELEPHONE ENCOUNTER
escitalopram (LEXAPRO) 10 MG tablet     Last Written Prescription Date: 3-28-17  Last Fill Quantity: 30, # refills: 1  Last Office Visit with [facility] primary care provider:  4-11-17        Last PHQ-9 score on record=   PHQ-9 SCORE 7/6/2016   Total Score -   Total Score 1

## 2017-08-08 RX ORDER — ESCITALOPRAM OXALATE 10 MG/1
TABLET ORAL
Qty: 30 TABLET | Refills: 1 | Status: SHIPPED | OUTPATIENT
Start: 2017-08-08 | End: 2017-10-14

## 2017-08-08 NOTE — TELEPHONE ENCOUNTER
Routing refill request to provider for review/approval because:  A break in medication      Aimee Drake RN  [facility]

## 2017-08-09 NOTE — TELEPHONE ENCOUNTER
Called patient at [phone] (home) .Left message on voicemail to return phone call to triage.  Marianne Byrne RN [facility] Triage.

## 2017-08-10 NOTE — TELEPHONE ENCOUNTER
Called patient at [phone]. Left message on voicemail to return phone call to triage.  Marianne Byrne, RN [facility] Triage.

## 2017-10-14 ENCOUNTER — TELEPHONE (OUTPATIENT)
Dept: FAMILY MEDICINE | Facility: CLINIC | Age: 57
End: 2017-10-14

## 2017-10-14 DIAGNOSIS — F41.1 GAD (GENERALIZED ANXIETY DISORDER): ICD-10-CM

## 2017-10-14 NOTE — LETTER
[address] [id number] [phone]          October 18, 2017    Amalia Miranda                                                                                                                     [facility] [place] [id number]            Dear Amalia,    We have tried to contact you, but have not been able to connect with you.    We were calling to let you know that we received a refill request for a medication.    We were able to send in a supply to your pharmacy, but the provider noted that you will need to be seen for a follow up in order to continue the medication.    Please call us at [phone] if you have any questions or to schedule an appointment.    Thank you          Sincerely,         Walter Villatoro MD

## 2017-10-16 RX ORDER — ESCITALOPRAM OXALATE 10 MG/1
TABLET ORAL
Qty: 30 TABLET | Refills: 0 | Status: SHIPPED | OUTPATIENT
Start: 2017-10-16 | End: 2018-06-01 | Stop reason: DRUGHIGH

## 2017-10-16 NOTE — TELEPHONE ENCOUNTER
escitalopram (LEXAPRO) 10 MG tablet     Last Written Prescription Date: 8/8/17  Last Fill Quantity: 30, # refills: 1  Last Office Visit with [facility] primary care provider:  4/11/17   Next 5 appointments (look out 90 days)     Oct 23, 2017  9:00 AM CDT   Return Visit with Elmer Cummins MD   [facility] [place] ([facility] [place])    [address] [facility] [address]  [place] [id number] [phone]                   Last PHQ-9 score on record=   PHQ-9 SCORE 7/6/2016   Total Score -   Total Score 1

## 2017-10-16 NOTE — TELEPHONE ENCOUNTER
6 mos follow up advised at 4/11/17 visit.    Lexapro used for anxiety but does have depression on problem list.    Medication is being filled for 1 time refill only due to:  Patient needs to be seen because due for 6 mos follow up.     Encounter routed to team to reach out to patient to schedule.    Basia Goyal RN  [facility]

## 2017-10-16 NOTE — TELEPHONE ENCOUNTER
Message left on home number for patient to call back TC line.  NTBS for anxiety check with Provider.    Genoveva PAULA

## 2017-10-23 ENCOUNTER — OFFICE VISIT (OUTPATIENT)
Dept: ORTHOPEDICS | Facility: CLINIC | Age: 57
End: 2017-10-23
Payer: COMMERCIAL

## 2017-10-23 VITALS — WEIGHT: 123.4 LBS | HEIGHT: 67 IN | BODY MASS INDEX: 19.37 KG/M2 | RESPIRATION RATE: 16 BRPM

## 2017-10-23 DIAGNOSIS — M18.12 ARTHRITIS OF CARPOMETACARPAL (CMC) JOINT OF LEFT THUMB: Primary | ICD-10-CM

## 2017-10-23 PROCEDURE — 20600 DRAIN/INJ JOINT/BURSA W/O US: CPT | Mod: LT | Performed by: ORTHOPAEDIC SURGERY

## 2017-10-23 RX ORDER — TRIAMCINOLONE ACETONIDE 40 MG/ML
40 INJECTION, SUSPENSION INTRA-ARTICULAR; INTRAMUSCULAR ONCE
Qty: 1 ML | Refills: 0 | OUTPATIENT
Start: 2017-10-23 | End: 2017-10-23

## 2017-10-23 ASSESSMENT — PAIN SCALES - GENERAL: PAINLEVEL: MODERATE PAIN (5)

## 2017-10-23 NOTE — PROGRESS NOTES
The patient's left thumb carpo metacarpal joint was prepped with betadine solution after verification of allergies. Area approximately 10 cm x 10 cm prepped in a sterile fashion. After injection, betadine removed with soap and water and band-aids applied.    0.5cc Lidocaine 1% NDC 8679-0756-89, LOT [id number],  [id number]  0.5cc Kenalog 40 NDC 9762-3637-79, LOT [id number],  [id number] injected into patient's left thumb carpo metacarpal joint by:   Tyron Estrada PA-C, [facility] (Ortho)  Supervising Physician: Elmer Cummins M.D., M.S.  Dept. of Orthopaedic Surgery  [facility]

## 2017-10-23 NOTE — PROGRESS NOTES
Chief Complaint   Patient presents with     RECHECK     WORK COMP. Bilateral CTR - left: 12/10/15, right: 12/26/14. Patient states her wrists are doing pretty good. She still doesn't have strength in her left wrist but she works with it. She knows she has some arthritis and that is what is bothering her at this time. She would like a left wrist injection today.        HPI: Amalia Miranda is a 57 year old female , left -hand dominant, who presents for recurrent left wrist/thumb pain.  She returns today for a repeat left thumb injection. Her last injection was on 3/28/2016 at the carpometacarpal joint. This injection worked great for a long while. Her thumb has been bothering her for some time now. Her mother had fallen and she was really unable to assist her because of the pain in her wrists/thumbs. Has known carpometacarpal arthritis.      PAST MEDICAL HISTORY:   Past Medical History:   Diagnosis Date     Allergies      Anxiety      Depression      Deviated septum 2000     GERD (gastroesophageal reflux disease)      Goiter, nontoxic, multinodular      Hemorrhoids      IBS (irritable bowel syndrome)      OCD (obsessive compulsive disorder)      Stress headaches      Thyroid nodule     2005     Patient Active Problem List    Diagnosis Date Noted     Health Care Home 01/11/2012     Priority: High     X    DX V65.8 REPLACED WITH 57[facility] C[facility] HOME (04/08/2013)       Menopausal vaginal dryness 04/19/2017     Priority: Medium     Acute right-sided low back pain with right-sided sciatica 06/21/2016     Priority: Medium     S/p bilateral carpal tunnel release 01/14/2016     Priority: Medium     ACP (advance care planning) 12/14/2015     Priority: Medium     Advance Care Planning 12/14/2015: ACP Review of Chart / Resources Provided:  Reviewed chart for advance care plan.  Amalia Miranda has no plan or code status on file. Discussed available resources and provided with information. Confirmed code status reflects current  choices pending further ACP discussions.  Confirmed/documented legally designated decision maker(s). Added by Danette Vizcaino             Hashimoto's thyroiditis 08/29/2013     Priority: Medium     Followed by ENDO in 2011        Goiter, nontoxic, multinodular      Priority: Medium     Distal radius fracture 01/31/2013     Priority: Medium     Knee pain 12/13/2011     Priority: Medium     Major depression in complete remission (H) 09/01/2011     Priority: Medium     Panic disorder with agoraphobia 04/28/2011     Priority: Medium     Dizziness 11/03/2010     Priority: Medium     IBS (irritable bowel syndrome) 11/03/2010     Priority: Medium     Panic attack 11/03/2010     Priority: Medium     Anxiety 11/03/2010     Priority: Medium     Benzodiazepine dependence (H) 11/03/2010     Priority: Medium     OCD (obsessive compulsive disorder) 11/03/2010     Priority: Medium     CARDIOVASCULAR SCREENING; LDL GOAL LESS THAN 160 10/31/2010     Priority: Medium       PAST SURGICAL HISTORY:   Past Surgical History:   Procedure Laterality Date     CLOSED REDUCTION, PERCUTANEOUS PINNING WRIST, COMBINED  2/4/13    left     COLONOSCOPY  1999     ENT SURGERY  9/13/16    Lower lip bx. Dr. Olsen     RELEASE CARPAL TUNNEL Left 12/10/2015    Procedure: RELEASE CARPAL TUNNEL;  Surgeon: Elmer Cummins MD;  Location: [facility]     RELEASE CARPAL TUNNEL Right 12/31/2015    Procedure: RELEASE CARPAL TUNNEL;  Surgeon: Elmer Cummins MD;  Location: [facility] OR     SEPTOPLASTY  06/05/2000     THYROIDECTOMY  AGE 13    PARTIAL       MEDICATIONS:    Current Outpatient Prescriptions   Medication Sig Dispense Refill     escitalopram (LEXAPRO) 10 MG tablet TAKE 1 TABLET (10 MG) BY MOUTH DAILY 30 tablet 0     conjugated estrogens (PREMARIN) cream Place 0.5 g vaginally twice a week 30 g 12     metroNIDAZOLE (FLAGYL) 500 MG tablet Take 1 tablet (500 mg) by mouth 2 times daily 14 tablet 0     omeprazole (PRILOSEC) 20 MG capsule Take 20 mg by mouth daily   "3     EXCEDRIN OR as needed       IMODIUM A-D 2 MG OR CHEW every day       CALTRATE 600 OR twice daily       VITAMIN D 400 UNIT OR CAPS 2 a day       ciprofloxacin (CIPRO) 500 MG tablet Take 1 tablet (500 mg) by mouth 2 times daily (Patient not taking: Reported on 10/23/2017) 14 tablet 0        ALLERGIES: No Known Allergies    ROS:   Denies numbness, tingling, parasthesias.   Denies headaches.   Denies fevers, chills, night sweats   Denies chest pain.   Denies shortness of breath.   Denies any skin problems, abrasions, rashes, irritation.     This document serves as a record of the services and decisions personally performed and made by Elmer Cummins MD. It was created on his behalf by Padmnii Maki, a trained medical scribe. The creation of this document is based the provider's statements to the medical scribe.    Scribsuki Maki 9:07 AM 10/23/2017       PHYSICAL EXAM  GENERAL APPEARANCE: healthy, alert, no distress.   SKIN: no suspicious lesions or rashes  RESPIRATORY: No increased work of breathing.  NEURO: Normal strength and tone, mentation intact and speech normal  PSYCH:  mentation appears normal and affect normal/bright. Not anxious.      Resp 16  Ht 1.702 m (5' 7\")  Wt 56 kg (123 lb 6.4 oz)  BMI 19.33 kg/m2     RIGHT HAND / WRIST EXAM:     Volar incision healed well.  No erythema. No drainage.    There is no swelling in the surgical area.  There is no tenderness in over the incision.  There is no ecchymosis.  There is no erythema of the surrounding skin.  There is no maceration of the skin.  There is no deformity in the area.  Range of motion: within normal limits.  Mild right carpometacarpal grind, nontender to palpation.    Intact sensation to light touch in the distribution of the median, radial, and ulnar nerves of the hand. Intact sensation of the radial and ulnar digital nerves of the thumb, index, long (middle), ring and small finger.  Brisk capillary refill to all fingers, warm and well-perfused. "   Palpable radial pulse.    LEFT HAND / WRIST EXAM:     Volar incision healed well.  There is no tenderness in the wrist or hand. There is tender to palpation thumb base at carpometacarpal joint.  Mild-moderate CMC grind of the thumb. Mild discomfort with manipulation of left thumb.   There is no ecchymosis.  There is no erythema of the surrounding skin.  There is no maceration of the skin.  There is no deformity in the area.  Range of motion: within normal limits.   strength good, near symmetric to the right.   Range of motion normal, symmetric to the right.    Intact sensation to light touch in the distribution of the median, radial, and ulnar nerves of the hand. Intact sensation of the radial and ulnar digital nerves of the thumb, index, long (middle), ring and small finger.  Brisk capillary refill to all fingers, warm and well-perfused.   Palpable radial pulse.      ASSESSMENT: 57 year old female with left thumb pain, left carpometacarpal basilar thumb arthritis.       PLAN:   * reviewed exam findings and xrays with patient. Consistent with carpometacarpal basilar thumb arthritis  * treatment options discussed  * rest  * ice  * NSAIDS  * activity modification  * thumb based splint immobilization with activities (refer to Hand Therapy/OT for splint, strengthening, rom)  * cortisone injections  * lastly surgery (LRTI, anchovy procedure versus fusion)  * return to clinic as needed.    PROCEDURE NOTE:  The risks, perceived benefits and potential complications (including but not limited to: bleeding, infection, pain, scar, damage to adjacent structures, atrophy or necrosis of soft tissue, skin blanching, failure to relieve symptoms, worsening of symptoms, allergic reaction) of injection were discussed with the patient. Questions were addressed and answered.The patient elected to proceed. Written informed consent was obtained. The correct procedural site was identified and confirmed. A LEFT thumb 1st CMC injection  was performed using .5 cc  Kenalog-40 40mg per mL and .5 cc of local anesthetic after sterile prep, to the correct procedural site. Sterile bandaid applied. This was tolerated well by the patient. No apparent complications. Did also discuss that if diabetic, recommend close monitoring of blood sugars over the next week as cortisone injections can temporarily elevate blood sugars.       The information in this document, created by a scribe for me, accurately reflects the services I personally performed and the decisions made by me. I have reviewed and approved this document for accuracy.      Elmer Cummins M.D., M.S.  Dept. of Orthopaedic Surgery  [facility]

## 2017-10-23 NOTE — PATIENT INSTRUCTIONS
Please remember to call and schedule a follow up appointment if one was recommended at your earliest convenience.  Orthopedics CLINIC HOURS TELEPHONE NUMBER   Dr. Placido Hui  Certified Medical Assistant   Monday & Wednesday   8am - 5pm  Thursday 1pm - 5pm  Friday 8am -11:30am Specialty schedulers:   [phone] to schedule your surgery.  Main Clinic:   [phone] to make an appointment with any provider.    Urgent Care locations:    [facility] Monday-Friday Closed  Saturday-Sunday 9am-5pm      Monday-Friday 12pm - 8pm  Saturday-Sunday 9am-5pm [phone]     If SURGERY has been recommended, please call our Specialty Schedulers at [phone] to schedule your procedure.    If you need a medication refill, please contact your pharmacy. Please allow 3 business days for your refill to be completed.    If an MRI or CT scan has been recommended, please call [facility] Schedulers at [phone] to schedule your appointment.  Use [employer] (secure e-mail communication and access to your chart) to send a message or to make an appointment. Please ask how you can sign up for [employer].  Your care team's suggested websites for health information:   [email or web address] : Up to date and easily searchable information on multiple topics.   [email or web address] : [place] dept of heat, public health issues in [place], N1N1

## 2017-10-23 NOTE — NURSING NOTE
"Chief Complaint   Patient presents with     RECHECK     WORK COMP. Bilateral CTR - left: 12/10/15, right: 12/26/14. Patient states her wrists are doing pretty good. She still doesn't have strength in her left wrist but she works with it. She knows she has some arthritis and that is what is bothering her at this time. She would like a left wrist injection today.        Initial Resp 16  Ht 1.702 m (5' 7\")  Wt 56 kg (123 lb 6.4 oz)  BMI 19.33 kg/m2 Estimated body mass index is 19.33 kg/(m^2) as calculated from the following:    Height as of this encounter: 1.702 m (5' 7\").    Weight as of this encounter: 56 kg (123 lb 6.4 oz).  Medication Reconciliation: alex Barrett Certified Medical Assistant    "

## 2017-11-15 DIAGNOSIS — F41.1 GAD (GENERALIZED ANXIETY DISORDER): ICD-10-CM

## 2017-11-16 RX ORDER — ESCITALOPRAM OXALATE 10 MG/1
TABLET ORAL
Qty: 30 TABLET | Refills: 0 | OUTPATIENT
Start: 2017-11-16

## 2017-11-16 NOTE — TELEPHONE ENCOUNTER
Back on 8/6 and 10/14 pt was sent letters to call to make apts. Called pt and she made an apt for Monday with Dr engelmann. Pt states that she is taking 1/2 a pill at this time and it is working. Will deny refill and have them discuss on Monday what best dose would be for pt. Tiny Brito RN-BSN

## 2017-11-20 ENCOUNTER — OFFICE VISIT (OUTPATIENT)
Dept: FAMILY MEDICINE | Facility: CLINIC | Age: 57
End: 2017-11-20
Payer: COMMERCIAL

## 2017-11-20 VITALS
BODY MASS INDEX: 19.26 KG/M2 | SYSTOLIC BLOOD PRESSURE: 100 MMHG | DIASTOLIC BLOOD PRESSURE: 63 MMHG | HEART RATE: 73 BPM | TEMPERATURE: 97.2 F | OXYGEN SATURATION: 100 % | WEIGHT: 123 LBS

## 2017-11-20 DIAGNOSIS — Z12.11 SPECIAL SCREENING FOR MALIGNANT NEOPLASMS, COLON: ICD-10-CM

## 2017-11-20 DIAGNOSIS — F32.5 MAJOR DEPRESSION IN COMPLETE REMISSION (H): ICD-10-CM

## 2017-11-20 DIAGNOSIS — F41.1 GAD (GENERALIZED ANXIETY DISORDER): Primary | ICD-10-CM

## 2017-11-20 PROCEDURE — 99213 OFFICE O/P EST LOW 20 MIN: CPT | Performed by: FAMILY MEDICINE

## 2017-11-20 RX ORDER — ESCITALOPRAM OXALATE 5 MG/1
5 TABLET ORAL DAILY
Qty: 90 TABLET | Refills: 1 | Status: SHIPPED | OUTPATIENT
Start: 2017-11-20 | End: 2018-05-22

## 2017-11-20 ASSESSMENT — ANXIETY QUESTIONNAIRES
IF YOU CHECKED OFF ANY PROBLEMS ON THIS QUESTIONNAIRE, HOW DIFFICULT HAVE THESE PROBLEMS MADE IT FOR YOU TO DO YOUR WORK, TAKE CARE OF THINGS AT HOME, OR GET ALONG WITH OTHER PEOPLE: SOMEWHAT DIFFICULT
1. FEELING NERVOUS, ANXIOUS, OR ON EDGE: SEVERAL DAYS
6. BECOMING EASILY ANNOYED OR IRRITABLE: NOT AT ALL
3. WORRYING TOO MUCH ABOUT DIFFERENT THINGS: NOT AT ALL
GAD7 TOTAL SCORE: 2
7. FEELING AFRAID AS IF SOMETHING AWFUL MIGHT HAPPEN: NOT AT ALL
5. BEING SO RESTLESS THAT IT IS HARD TO SIT STILL: SEVERAL DAYS
2. NOT BEING ABLE TO STOP OR CONTROL WORRYING: NOT AT ALL

## 2017-11-20 ASSESSMENT — PATIENT HEALTH QUESTIONNAIRE - PHQ9
5. POOR APPETITE OR OVEREATING: NOT AT ALL
SUM OF ALL RESPONSES TO PHQ QUESTIONS 1-9: 3

## 2017-11-20 ASSESSMENT — PAIN SCALES - GENERAL: PAINLEVEL: NO PAIN (0)

## 2017-11-20 NOTE — PROGRESS NOTES
SUBJECTIVE:   Amalia Miranda is a 57 year old female who presents to clinic today for the following health issues:    Anxiety Follow-Up    Status since last visit: Improved- sleeping better, does not wake up anxious     Other associated symptoms:None    Complicating factors:   Significant life event: Mother is sick and daughter is an alcoholic   Current substance abuse: None  Depression symptoms: No  COREY-7 SCORE 6/16/2016 7/6/2016 11/20/2017   Total Score - - -   Total Score 0 0 2       GAD7      Amount of exercise or physical activity: Walking    Problems taking medications regularly: No    Medication side effects: none    Diet: regular (no restrictions)    PHQ-9 SCORE 6/16/2016 7/6/2016 11/20/2017   Total Score - - -   Total Score 0 1 3     Has been taking 5mg of escitalopram and thinks it's more helpful than 10mg.     Due for colon cancer screening, does not want another colonoscopy. Amenable to FIT.     Problem list and histories reviewed & adjusted, as indicated.  Additional history: as documented    Patient Active Problem List   Diagnosis     CARDIOVASCULAR SCREENING; LDL GOAL LESS THAN 160     Dizziness     IBS (irritable bowel syndrome)     Panic attack     Anxiety     Benzodiazepine dependence (H)     OCD (obsessive compulsive disorder)     Panic disorder with agoraphobia     Major depression in complete remission (H)     Knee pain     Health Care Home     Distal radius fracture     Hashimoto's thyroiditis     Goiter, nontoxic, multinodular     ACP (advance care planning)     S/p bilateral carpal tunnel release     Acute right-sided low back pain with right-sided sciatica     Menopausal vaginal dryness     Past Surgical History:   Procedure Laterality Date     CLOSED REDUCTION, PERCUTANEOUS PINNING WRIST, COMBINED  2/4/13    left     COLONOSCOPY  1999     ENT SURGERY  9/13/16    Lower lip bx. Dr. Olsen     RELEASE CARPAL TUNNEL Left 12/10/2015    Procedure: RELEASE CARPAL TUNNEL;  Surgeon: Elmer Cummins  MD Rober;  Location: [facility] OR     RELEASE CARPAL TUNNEL Right 12/31/2015    Procedure: RELEASE CARPAL TUNNEL;  Surgeon: Elmer Cummins MD;  Location: [facility] OR     SEPTOPLASTY  06/05/2000     THYROIDECTOMY  AGE 13    PARTIAL       Social History   Substance Use Topics     Smoking status: Never Smoker     Smokeless tobacco: Never Used     Alcohol use No     Family History   Problem Relation Age of Onset     Hypertension Mother      Alcohol/Drug Mother      Arthritis Mother      Depression Mother      Dementia Mother      DIABETES Father      DIABETES Maternal Grandmother      Arthritis Maternal Grandmother      Unknown/Adopted Maternal Grandfather      Unknown/Adopted Paternal Grandmother      Unknown/Adopted Paternal Grandfather      Hypertension Sister      Allergies Sister      Allergies Son      Asthma Daughter      Allergies Daughter      Respiratory Daughter      Depression Sister      Gynecology Sister      Musculoskeletal Disorder Sister      Psychotic Disorder Sister      Thyroid Disease Sister              Reviewed and updated as needed this visit by clinical staffTobacco  Allergies  Meds  Med Hx  Surg Hx  Fam Hx  Soc Hx      Reviewed and updated as needed this visit by Provider         ROS:  Constitutional, HEENT, cardiovascular, pulmonary, gi and gu systems are negative, except as otherwise noted.      OBJECTIVE:   /63 (BP Location: Right arm, Patient Position: Chair, Cuff Size: Adult Regular)  Pulse 73  Temp 97.2  F (36.2  C) (Axillary)  Wt 123 lb (55.8 kg)  SpO2 100%  BMI 19.26 kg/m2  Body mass index is 19.26 kg/(m^2).  GENERAL: healthy, alert and no distress  NECK: no adenopathy, no asymmetry, masses, or scars and thyroid normal to palpation  RESP: lungs clear to auscultation - no rales, rhonchi or wheezes  CV: regular rate and rhythm, normal S1 S2, no S3 or S4, no murmur, click or rub, no peripheral edema and peripheral pulses strong  MS: no gross musculoskeletal defects noted, no  "edema  PSYCH: MENTAL STATUS EXAM  Appearance: appropriate, well-groomed   Attitude: cooperative, engaged in conversation   Behavior: normal, no psychomotor agitation or retardation   Eye Contact: normal  Speech: normal jana, normal content, normal volume   Orientation: oriented to person, place, time and situation  Mood: \"doing ok\"  Affect: bright, mood-congruent   Thought Process: linear   Suicidal Ideation: No passive or active SI or plan, no thoughts of self-harm, reports children as protective factor   Hallucination: no A or V hallucinations    Diagnostic Test Results:  none     ASSESSMENT/PLAN:       ICD-10-CM    1. COREY (generalized anxiety disorder) F41.1 escitalopram (LEXAPRO) 5 MG tablet   2. Major depression in complete remission (H) F32.5    3. Special screening for malignant neoplasms, colon Z12.11 Fecal colorectal cancer screen (FIT)     Ok to continue 5mg tabs as she is getting relief from that dose.   FIT card discussed and provided to patient.     See Patient Instructions    Lauren A. Engelmann, MD  [facility]    "

## 2017-11-20 NOTE — LETTER
My Depression Action Plan  Name: Amalia Miranda   Date of Birth 1960  Date: 11/20/2017    My doctor: Remi [place], [facility]   My clinic: [address] [id number] [phone]          GREEN    ZONE   Good Control    What it looks like:     Things are going generally well. You have normal up s and down s. You may even feel depressed from time to time, but bad moods usually last less than a day.   What you need to do:  1. Continue to care for yourself (see self care plan)  2. Check your depression survival kit and update it as needed  3. Follow your physician s recommendations including any medication.  4. Do not stop taking medication unless you consult with your physician first.           YELLOW         ZONE Getting Worse    What it looks like:     Depression is starting to interfere with your life.     It may be hard to get out of bed; you may be starting to isolate yourself from others.    Symptoms of depression are starting to last most all day and this has happened for several days.     You may have suicidal thoughts but they are not constant.   What you need to do:     1. Call your care team, your response to treatment will improve if you keep your care team informed of your progress. Yellow periods are signs an adjustment may need to be made.     2. Continue your self-care, even if you have to fake it!    3. Talk to someone in your support network    4. Open up your depression survival kit           RED    ZONE Medical Alert - Get Help    What it looks like:     Depression is seriously interfering with your life.     You may experience these or other symptoms: You can t get out of bed most days, can t work or engage in other necessary activities, you have trouble taking care of basic hygiene, or basic responsibilities, thoughts of suicide or death that will not go away, self-injurious behavior.     What you need to  do:  1. Call your care team and request a same-day appointment. If they are not available (weekends or after hours) call your local crisis line, emergency room or 911.      Electronically signed by: Lauren A. Engelmann, November 20, 2017    Depression Self Care Plan / Survival Kit    Self-Care for Depression  Here s the deal. Your body and mind are really not as separate as most people think.  What you do and think affects how you feel and how you feel influences what you do and think. This means if you do things that people who feel good do, it will help you feel better.  Sometimes this is all it takes.  There is also a place for medication and therapy depending on how severe your depression is, so be sure to consult with your medical provider and/ or Behavioral Health Consultant if your symptoms are worsening or not improving.     In order to better manage my stress, I will:    Exercise  Get some form of exercise, every day. This will help reduce pain and release endorphins, the  feel good  chemicals in your brain. This is almost as good as taking antidepressants!  This is not the same as joining a gym and then never going! (they count on that by the way ) It can be as simple as just going for a walk or doing some gardening, anything that will get you moving.      Hygiene   Maintain good hygiene (Get out of bed in the morning, Make your bed, Brush your teeth, Take a shower, and Get dressed like you were going to work, even if you are unemployed).  If your clothes don't fit try to get ones that do.    Diet  I will strive to eat foods that are good for me, drink plenty of water, and avoid excessive sugar, caffeine, alcohol, and other mood-altering substances.  Some foods that are helpful in depression are: complex carbohydrates, B vitamins, flaxseed, fish or fish oil, fresh fruits and vegetables.    Psychotherapy  I agree to participate in Individual Therapy (if recommended).    Medication  If prescribed  medications, I agree to take them.  Missing doses can result in serious side effects.  I understand that drinking alcohol, or other illicit drug use, may cause potential side effects.  I will not stop my medication abruptly without first discussing it with my provider.    Staying Connected With Others  I will stay in touch with my friends, family members, and my primary care provider/team.    Use your imagination  Be creative.  We all have a creative side; it doesn t matter if it s oil painting, sand castles, or mud pies! This will also kick up the endorphins.    Witness Beauty  (AKA stop and smell the roses) Take a look outside, even in mid-winter. Notice colors, textures. Watch the squirrels and birds.     Service to others  Be of service to others.  There is always someone else in need.  By helping others we can  get out of ourselves  and remember the really important things.  This also provides opportunities for practicing all the other parts of the program.    Humor  Laugh and be silly!  Adjust your TV habits for less news and crime-drama and more comedy.    Control your stress  Try breathing deep, massage therapy, biofeedback, and meditation. Find time to relax each day.     My support system    Clinic Contact:  Dr. Engelmann Phone number: [phone]   Contact 1:  Phone number:    Contact 2:  Phone number:    Jain/:  Phone number:    Therapist:  Phone number:    [facility] crisis center:    Phone number:    Other community support:  Phone number:

## 2017-11-20 NOTE — NURSING NOTE
"Chief Complaint   Patient presents with     Anxiety       Initial /63 (BP Location: Right arm, Patient Position: Chair, Cuff Size: Adult Regular)  Pulse 73  Temp 97.2  F (36.2  C) (Axillary)  Wt 123 lb (55.8 kg)  SpO2 100%  BMI 19.26 kg/m2 Estimated body mass index is 19.26 kg/(m^2) as calculated from the following:    Height as of 10/23/17: 5' 7\" (1.702 m).    Weight as of this encounter: 123 lb (55.8 kg).  Medication Reconciliation: complete     Bhumi Tillman MA      "

## 2017-11-20 NOTE — MR AVS SNAPSHOT
"              After Visit Summary   11/20/2017    Amalia Miranda    MRN: [id number]           Patient Information     Date Of Birth          1960        Visit Information        Provider Department      11/20/2017 8:00 AM Engelmann, Lauren Anneliese, MD [facility]        Today's Diagnoses     Special screening for malignant neoplasms, colon    -  1    COREY (generalized anxiety disorder)          Care Instructions    I will see you back in 6 months, or sooner if needed.           Follow-ups after your visit        Future tests that were ordered for you today     Open Future Orders        Priority Expected Expires Ordered    Fecal colorectal cancer screen (FIT) Routine 12/11/2017 2/12/2018 11/20/2017            Who to contact     If you have questions or need follow up information about today's clinic visit or your schedule please contact [facility] directly at [phone].  Normal or non-critical lab and imaging results will be communicated to you by MyChart, letter or phone within 4 business days after the clinic has received the results. If you do not hear from us within 7 days, please contact the clinic through MyChart or phone. If you have a critical or abnormal lab result, we will notify you by phone as soon as possible.  Submit refill requests through [employer] or call your pharmacy and they will forward the refill request to us. Please allow 3 business days for your refill to be completed.          Additional Information About Your Visit        MyChart Information     [employer] lets you send messages to your doctor, view your test results, renew your prescriptions, schedule appointments and more. To sign up, go to [email or web address] . Click on \"Log in\" on the left side of the screen, which will take you to the Welcome page. Then click on \"Sign up Now\" on the right side of the page.     You will be asked to enter the access code listed below, as well as some " personal information. Please follow the directions to create your username and password.     Your access code is: [email or web address]  Expires: 2018  7:51 AM     Your access code will  in 90 days. If you need help or a new code, please call your [place] clinic or [phone].        Care EveryWhere ID     This is your Care EveryWhere ID. This could be used by other organizations to access your [place] medical records  [id number]        Your Vitals Were     Pulse Temperature Pulse Oximetry BMI (Body Mass Index)          73 97.2  F (36.2  C) (Axillary) 100% 19.26 kg/m2         Blood Pressure from Last 3 Encounters:   17 100/63   17 106/67   17 114/66    Weight from Last 3 Encounters:   17 123 lb (55.8 kg)   10/23/17 123 lb 6.4 oz (56 kg)   17 117 lb (53.1 kg)              We Performed the Following     DEPRESSION ACTION PLAN (DAP)          Today's Medication Changes          These changes are accurate as of: 17  8:26 AM.  If you have any questions, ask your nurse or doctor.               These medicines have changed or have updated prescriptions.        Dose/Directions    * escitalopram 10 MG tablet   Commonly known as:  LEXAPRO   This may have changed:  Another medication with the same name was added. Make sure you understand how and when to take each.   Used for:  COREY (generalized anxiety disorder)   Changed by:  Walter Villatoro MD        TAKE 1 TABLET (10 MG) BY MOUTH DAILY   Quantity:  30 tablet   Refills:  0       * escitalopram 5 MG tablet   Commonly known as:  LEXAPRO   This may have changed:  You were already taking a medication with the same name, and this prescription was added. Make sure you understand how and when to take each.   Used for:  COREY (generalized anxiety disorder)   Changed by:  Engelmann, Lauren Anneliese, MD        Dose:  5 mg   Take 1 tablet (5 mg) by mouth daily   Quantity:  90 tablet   Refills:  1       * Notice:  This list has 2  medication(s) that are the same as other medications prescribed for you. Read the directions carefully, and ask your doctor or other care provider to review them with you.         Where to get your medicines      These medications were sent to [facility]/pharmacy #[id number] - [place] - [address] [place] [address]     Phone:  [phone]     escitalopram 5 MG tablet                Primary Care Provider Office Phone # Fax #    [facility] [phone]       No address on file        Equal Access to Services     HILTON RANDALL : Hadii skyler ku hadasho Soomaali, waaxda luqadaha, qaybta kaalmada adeegyada, feli chowdhury . So [facility] [phone].    ATENCIÓN: Si habla español, tiene a sow disposición servicios gratuitos de asistencia lingüística. Llame al [phone].    We comply with applicable federal civil rights laws and [place] laws. We do not discriminate on the basis of race, color, national origin, age, disability, sex, sexual orientation, or gender identity.            Thank you!     Thank you for choosing [facility]  for your care. Our goal is always to provide you with excellent care. Hearing back from our patients is one way we can continue to improve our services. Please take a few minutes to complete the written survey that you may receive in the mail after your visit with us. Thank you!             Your Updated Medication List - Protect others around you: Learn how to safely use, store and throw away your medicines at www.disposemymeds.org.          This list is accurate as of: 11/20/17  8:26 AM.  Always use your most recent med list.                   Brand Name Dispense Instructions for use Diagnosis    CALTRATE 600 PO      twice daily        * escitalopram 10 MG tablet    LEXAPRO    30 tablet    TAKE 1 TABLET (10 MG) BY MOUTH DAILY    COREY (generalized anxiety disorder)       * escitalopram 5 MG tablet     LEXAPRO    90 tablet    Take 1 tablet (5 mg) by mouth daily    COREY (generalized anxiety disorder)       EXCEDRIN PO      as needed        IMODIUM A-D 2 MG Chew   Generic drug:  Loperamide HCl      every day        omeprazole 20 MG CR capsule    priLOSEC     Take 20 mg by mouth daily        vitamin D 400 UNITS capsule      2 a day        * Notice:  This list has 2 medication(s) that are the same as other medications prescribed for you. Read the directions carefully, and ask your doctor or other care provider to review them with you.

## 2017-11-21 ASSESSMENT — ANXIETY QUESTIONNAIRES: GAD7 TOTAL SCORE: 2

## 2017-12-03 PROCEDURE — 82274 ASSAY TEST FOR BLOOD FECAL: CPT | Performed by: FAMILY MEDICINE

## 2017-12-07 DIAGNOSIS — Z12.11 SPECIAL SCREENING FOR MALIGNANT NEOPLASMS, COLON: ICD-10-CM

## 2017-12-07 LAB — HEMOCCULT STL QL IA: NEGATIVE

## 2017-12-07 NOTE — LETTER
[facility]   [address] [place]  [address]  [phone]                                   December 7, 2017    Amalia HAZEL Patricia  [address] [facility] [address]        Dear Amalia,    Your FIT testing was normal.   I still recommend that we do colonoscopy since it is the well established screening tool.   There are risks involved with doing a colonoscopy and so since you agreed to do FIT testing, you should continue to do it  yearly for five years.   You need to do it regularly to achieve the same level of confidence as the colonoscopy.  When done correctly, this can be substituted for the colonoscopy  If it turns positive then we would have to proceed with colonoscopy to further evaluate.     Results for orders placed or performed in visit on 12/07/17   Fecal colorectal cancer screen (FIT)   Result Value Ref Range    Occult Blood Scn FIT Negative NEG^Negative       If you have any questions please call the clinic at [phone]    Sincerely,    Walter Villatoro MD  bmd

## 2018-05-22 DIAGNOSIS — F41.1 GAD (GENERALIZED ANXIETY DISORDER): ICD-10-CM

## 2018-05-23 RX ORDER — ESCITALOPRAM OXALATE 5 MG/1
TABLET ORAL
Qty: 90 TABLET | Refills: 1 | Status: SHIPPED | OUTPATIENT
Start: 2018-05-23 | End: 2018-06-01

## 2018-05-23 NOTE — TELEPHONE ENCOUNTER
"Requested Prescriptions   Pending Prescriptions Disp Refills     escitalopram (LEXAPRO) 5 MG tablet [Pharmacy Med Name: ESCITALOPRAM 5 MG TABLET] 90 tablet 1    Last Written Prescription Date:  11-20-17  Last Fill Quantity: 90,  # refills: 1   Last office visit: 11/20/2017 with prescribing provider:     Future Office Visit:     Sig: TAKE 1 TABLET (5 MG) BY MOUTH DAILY    SSRIs Protocol Passed    5/22/2018  5:07 PM       Passed - Recent (12 mo) or future (30 days) visit within the authorizing provider's specialty    Patient had office visit in the last 12 months or has a visit in the next 30 days with authorizing provider or within the authorizing provider's specialty.  See \"Patient Info\" tab in inbasket, or \"Choose Columns\" in Meds & Orders section of the refill encounter.           Passed - Patient is age 18 or older       Passed - No active pregnancy on record       Passed - No positive pregnancy test in last 12 months          "

## 2018-05-24 NOTE — TELEPHONE ENCOUNTER
Prescription approved per [facility] Refill Protocol.  Do Jaquez RN  [facility]

## 2018-06-01 ENCOUNTER — OFFICE VISIT (OUTPATIENT)
Dept: FAMILY MEDICINE | Facility: CLINIC | Age: 58
End: 2018-06-01
Payer: COMMERCIAL

## 2018-06-01 VITALS
BODY MASS INDEX: 20.56 KG/M2 | HEIGHT: 67 IN | WEIGHT: 131 LBS | DIASTOLIC BLOOD PRESSURE: 69 MMHG | TEMPERATURE: 98.5 F | HEART RATE: 64 BPM | SYSTOLIC BLOOD PRESSURE: 106 MMHG

## 2018-06-01 DIAGNOSIS — E78.5 HYPERLIPIDEMIA LDL GOAL <100: ICD-10-CM

## 2018-06-01 DIAGNOSIS — K21.9 GASTROESOPHAGEAL REFLUX DISEASE, ESOPHAGITIS PRESENCE NOT SPECIFIED: ICD-10-CM

## 2018-06-01 DIAGNOSIS — K58.9 IRRITABLE BOWEL SYNDROME, UNSPECIFIED TYPE: ICD-10-CM

## 2018-06-01 DIAGNOSIS — F41.1 GAD (GENERALIZED ANXIETY DISORDER): Primary | ICD-10-CM

## 2018-06-01 PROCEDURE — 99214 OFFICE O/P EST MOD 30 MIN: CPT | Performed by: FAMILY MEDICINE

## 2018-06-01 RX ORDER — ESCITALOPRAM OXALATE 5 MG/1
5 TABLET ORAL DAILY
Qty: 90 TABLET | Refills: 3 | Status: SHIPPED | OUTPATIENT
Start: 2018-06-01

## 2018-06-01 ASSESSMENT — ANXIETY QUESTIONNAIRES
IF YOU CHECKED OFF ANY PROBLEMS ON THIS QUESTIONNAIRE, HOW DIFFICULT HAVE THESE PROBLEMS MADE IT FOR YOU TO DO YOUR WORK, TAKE CARE OF THINGS AT HOME, OR GET ALONG WITH OTHER PEOPLE: NOT DIFFICULT AT ALL
3. WORRYING TOO MUCH ABOUT DIFFERENT THINGS: NOT AT ALL
2. NOT BEING ABLE TO STOP OR CONTROL WORRYING: NOT AT ALL
1. FEELING NERVOUS, ANXIOUS, OR ON EDGE: SEVERAL DAYS
5. BEING SO RESTLESS THAT IT IS HARD TO SIT STILL: NOT AT ALL
7. FEELING AFRAID AS IF SOMETHING AWFUL MIGHT HAPPEN: NOT AT ALL
GAD7 TOTAL SCORE: 2
6. BECOMING EASILY ANNOYED OR IRRITABLE: SEVERAL DAYS

## 2018-06-01 ASSESSMENT — PAIN SCALES - GENERAL: PAINLEVEL: NO PAIN (0)

## 2018-06-01 ASSESSMENT — PATIENT HEALTH QUESTIONNAIRE - PHQ9: 5. POOR APPETITE OR OVEREATING: NOT AT ALL

## 2018-06-01 NOTE — MR AVS SNAPSHOT
"              After Visit Summary   6/1/2018    Amalia Miranda    MRN: [id number]           Patient Information     Date Of Birth          1960        Visit Information        Provider Department      6/1/2018 6:40 AM Timoteo Murguia MD [facility]        Today's Diagnoses     Gastroesophageal reflux disease, esophagitis presence not specified    -  1    COREY (generalized anxiety disorder)          Care Instructions    Continue current meds    Keep working on healthy diet/exercise     Call / return to clinic with problems/ questions          Follow-ups after your visit        Who to contact     If you have questions or need follow up information about today's clinic visit or your schedule please contact [facility] directly at [phone].  Normal or non-critical lab and imaging results will be communicated to you by MyChart, letter or phone within 4 business days after the clinic has received the results. If you do not hear from us within 7 days, please contact the clinic through MyChart or phone. If you have a critical or abnormal lab result, we will notify you by phone as soon as possible.  Submit refill requests through [employer] or call your pharmacy and they will forward the refill request to us. Please allow 3 business days for your refill to be completed.          Additional Information About Your Visit        MyChart Information     [employer] lets you send messages to your doctor, view your test results, renew your prescriptions, schedule appointments and more. To sign up, go to [email or web address] . Click on \"Log in\" on the left side of the screen, which will take you to the Welcome page. Then click on \"Sign up Now\" on the right side of the page.     You will be asked to enter the access code listed below, as well as some personal information. Please follow the directions to create your username and password.     Your access code is: [email or web address]  Expires: " "2018  7:01 AM     Your access code will  in 90 days. If you need help or a new code, please call your [facility] or [phone].        Care EveryWhere ID     This is your Care EveryWhere ID. This could be used by other organizations to access your [place] medical records  [id number]        Your Vitals Were     Pulse Temperature Height Breastfeeding? BMI (Body Mass Index)       64 98.5  F (36.9  C) (Oral) 5' 6.53\" (1.69 m) No 20.81 kg/m2        Blood Pressure from Last 3 Encounters:   18 106/69   17 100/63   17 106/67    Weight from Last 3 Encounters:   18 131 lb (59.4 kg)   17 123 lb (55.8 kg)   10/23/17 123 lb 6.4 oz (56 kg)              Today, you had the following     No orders found for display         Today's Medication Changes          These changes are accurate as of 18  7:01 AM.  If you have any questions, ask your nurse or doctor.               These medicines have changed or have updated prescriptions.        Dose/Directions    escitalopram 5 MG tablet   Commonly known as:  LEXAPRO   This may have changed:  Another medication with the same name was removed. Continue taking this medication, and follow the directions you see here.   Used for:  COREY (generalized anxiety disorder)   Changed by:  Timoteo Murguia MD        Dose:  5 mg   Take 1 tablet (5 mg) by mouth daily   Quantity:  90 tablet   Refills:  3            Where to get your medicines      These medications were sent to [facility]/pharmacy #[id number] [facility] [address] [place] [address]     Phone:  [phone]     escitalopram 5 MG tablet    omeprazole 20 MG CR capsule                Primary Care Provider Office Phone # Fax #    [facility] [place] [place] [phone]       No address on file        Equal Access to Services     HILTON RANDALL AH: Himanshu Mack, wadeirdreda luqadaha, qaybta kaalmanarciso blackwell, feli saldana " lalester pang. So [facility] [phone].    ATENCIÓN: Si habla la nena, tiene a sow disposición servicios gratuitos de asistencia lingüística. Genna al [phone].    We comply with applicable federal civil rights laws and [place] laws. We do not discriminate on the basis of race, color, national origin, age, disability, sex, sexual orientation, or gender identity.            Thank you!     Thank you for choosing [facility]  for your care. Our goal is always to provide you with excellent care. Hearing back from our patients is one way we can continue to improve our services. Please take a few minutes to complete the written survey that you may receive in the mail after your visit with us. Thank you!             Your Updated Medication List - Protect others around you: Learn how to safely use, store and throw away your medicines at www.disposemymeds.org.          This list is accurate as of 6/1/18  7:01 AM.  Always use your most recent med list.                   Brand Name Dispense Instructions for use Diagnosis    CALTRATE 600 PO      twice daily        escitalopram 5 MG tablet    LEXAPRO    90 tablet    Take 1 tablet (5 mg) by mouth daily    COREY (generalized anxiety disorder)       EXCEDRIN PO      as needed        IMODIUM A-D 2 MG Chew   Generic drug:  Loperamide HCl      every day        omeprazole 20 MG CR capsule    priLOSEC    90 capsule    Take 1 capsule (20 mg) by mouth daily    Gastroesophageal reflux disease, esophagitis presence not specified       vitamin D 400 units capsule      2 a day

## 2018-06-01 NOTE — PATIENT INSTRUCTIONS
Continue current meds    Keep working on healthy diet/exercise     Call / return to clinic with problems/ questions

## 2018-06-01 NOTE — PROGRESS NOTES
SUBJECTIVE:   Amalia Miranda is a 58 year old female who presents to clinic today for the following health issues:       Medication Followup of Lexapro    Taking Medication as prescribed: yes    Side Effects:  None    Medication Helping Symptoms:  yes       none    Problem list and histories reviewed & adjusted, as indicated.  Additional history: as documented         Reviewed and updated as needed this visit by clinical staff  Tobacco  Allergies  Meds  Problems  Med Hx  Surg Hx  Fam Hx  Soc Hx        Reviewed and updated as needed this visit by Provider          had this for a year, on lexapro    10 mg made her feel funny in am    No side effects on 5 mg ; a little drowsy but fine since takes at night    Working well    Patient needs more sleep than used to     8-9 hours of sleep        Walking for exercise    History of IBS for 20 years, manageable    Heartburn well controlled    No chest pain/ breathing problems.    Physical Exam   Constitutional: She is oriented to person, place, and time and well-developed, well-nourished, and in no distress. No distress.   HENT:   Head: Normocephalic and atraumatic.   Neck: Carotid bruit is not present.   Cardiovascular: Normal rate, regular rhythm, normal heart sounds and intact distal pulses.  Exam reveals no gallop and no friction rub.    No murmur heard.  Pulmonary/Chest: Effort normal and breath sounds normal.   Musculoskeletal: She exhibits no edema.   Neurological: She is alert and oriented to person, place, and time.   Skin: She is not diaphoretic.   Psychiatric: Mood and affect normal.     No tremor/ shaking when holding hands out front       ASSESSMENT / PLAN:  (F41.1) COREY (generalized anxiety disorder)  (primary encounter diagnosis)  Comment: patient doing very well on this med.  Refills given.  See gad7 and phq9.    Plan: escitalopram (LEXAPRO) 5 MG tablet             (K21.9) Gastroesophageal reflux disease, esophagitis presence not  specified  Comment: patient doing well on this, no side effects.  In past she tried h2 blocker, did not work.  We did discuss possible side effects of long term ppi.  Plan: omeprazole (PRILOSEC) 20 MG CR capsule             (K58.9) Irritable bowel syndrome, unspecified type  Comment: very manageable per patient   Plan: monitor    (E78.5) Hyperlipidemia LDL goal <100  Comment: high LDL in past   Plan: patient declined blood tests today.  Keep working on healthy diet/exercise.       I reviewed the patient's medications, allergies, medical history, family history, and social history.    Timoteo Murguia MD

## 2018-06-02 ASSESSMENT — ANXIETY QUESTIONNAIRES: GAD7 TOTAL SCORE: 2

## 2018-06-02 ASSESSMENT — PATIENT HEALTH QUESTIONNAIRE - PHQ9: SUM OF ALL RESPONSES TO PHQ QUESTIONS 1-9: 2

## 2018-07-20 ENCOUNTER — OFFICE VISIT (OUTPATIENT)
Dept: FAMILY MEDICINE | Facility: CLINIC | Age: 58
End: 2018-07-20
Payer: COMMERCIAL

## 2018-07-20 ENCOUNTER — RADIANT APPOINTMENT (OUTPATIENT)
Dept: GENERAL RADIOLOGY | Facility: CLINIC | Age: 58
End: 2018-07-20
Attending: FAMILY MEDICINE
Payer: COMMERCIAL

## 2018-07-20 VITALS
SYSTOLIC BLOOD PRESSURE: 112 MMHG | OXYGEN SATURATION: 98 % | BODY MASS INDEX: 21.12 KG/M2 | DIASTOLIC BLOOD PRESSURE: 63 MMHG | TEMPERATURE: 98.2 F | HEART RATE: 79 BPM | WEIGHT: 133 LBS

## 2018-07-20 DIAGNOSIS — R07.89 CHEST WALL PAIN: Primary | ICD-10-CM

## 2018-07-20 DIAGNOSIS — R07.89 CHEST WALL PAIN: ICD-10-CM

## 2018-07-20 PROCEDURE — 71101 X-RAY EXAM UNILAT RIBS/CHEST: CPT | Mod: LT

## 2018-07-20 PROCEDURE — 99213 OFFICE O/P EST LOW 20 MIN: CPT | Performed by: FAMILY MEDICINE

## 2018-07-20 RX ORDER — ASCORBIC ACID 125 MG
1 TABLET,CHEWABLE ORAL WEEKLY
COMMUNITY

## 2018-07-20 NOTE — PROGRESS NOTES
SUBJECTIVE:   Amalia Miranda is a 58 year old female who presents to clinic today for the following health issues:    10-11 days ago, Amalia bent over to pick something off the floor felt a snap on the left side / rib area and pain that radiates around to her back  She is splinting her ribs when she gets up     She does not have a more serious injury history     Past Medical History:   Diagnosis Date     Allergies      Anxiety      Depression      Deviated septum 2000     GERD (gastroesophageal reflux disease)      Goiter, nontoxic, multinodular      Hemorrhoids      IBS (irritable bowel syndrome)      OCD (obsessive compulsive disorder)      Stress headaches      Thyroid nodule     2005       Past Surgical History:   Procedure Laterality Date     CLOSED REDUCTION, PERCUTANEOUS PINNING WRIST, COMBINED  2/4/13    left     COLONOSCOPY  1999     ENT SURGERY  9/13/16    Lower lip bx. Dr. Olsen     RELEASE CARPAL TUNNEL Left 12/10/2015    Procedure: RELEASE CARPAL TUNNEL;  Surgeon: Elmer Cummins MD;  Location: [facility] OR     RELEASE CARPAL TUNNEL Right 12/31/2015    Procedure: RELEASE CARPAL TUNNEL;  Surgeon: Elmer Cummins MD;  Location: [facility] OR     SEPTOPLASTY  06/05/2000     THYROIDECTOMY  AGE 13    PARTIAL       Family History   Problem Relation Age of Onset     Hypertension Mother      Alcohol/Drug Mother      Arthritis Mother      Depression Mother      Dementia Mother      Diabetes Father      Diabetes Maternal Grandmother      Arthritis Maternal Grandmother      Unknown/Adopted Maternal Grandfather      Unknown/Adopted Paternal Grandmother      Unknown/Adopted Paternal Grandfather      Hypertension Sister      Allergies Sister      Allergies Son      Asthma Daughter      Allergies Daughter      Respiratory Daughter      Depression Sister      Gynecology Sister      Musculoskeletal Disorder Sister      Psychotic Disorder Sister      Thyroid Disease Sister        Social History   Substance Use Topics     Smoking  status: Never Smoker     Smokeless tobacco: Never Used     Alcohol use No     O: /63 (BP Location: Right arm, Patient Position: Sitting, Cuff Size: Adult Regular)  Pulse 79  Temp 98.2  F (36.8  C) (Oral)  Wt 133 lb (60.3 kg)  SpO2 98%  Breastfeeding? No  BMI 21.12 kg/m2    Chest wall normal to inspection and palpation. Good excursion bilaterally. Lungs clear to auscultation. Good air movement bilaterally without rales, wheezes, or rhonchi.   Regular rate and  rhythm. S1 and S2 normal, no murmurs, clicks, gallops or rubs. No edema or JVD.    Patient has pain directly over the ribs     xrays appear normal to me; await xray read     A: chest wall pain     P: ibuprofen and ice     I suspect when the patient reached over she either pulled a rib muscle or strained the costochondral junction causing costochondritis.  This should resolve 3-4 weeks after the injury now better she was told to recheck.    Await the overread by radiology    Problem list and histories reviewed & adjusted, as indicated.      Reviewed and updated as needed this visit by clinical staff       Reviewed and updated as needed this visit by Provider

## 2018-07-20 NOTE — LETTER
[facility]   [address] [place]  [address]  [phone]                                   July 24, 2018    Amaliafabienne Miranda  [address] [facility] [address]        Dear Amalia,    Your xray showed NO  xray evidence of fractured ribs or fluid in the lungs     Results for orders placed or performed in visit on 07/20/18   XR Ribs & Chest Left G/E 3 Views    Narrative    LEFT RIBS WITH CHEST THREE VIEWS  7/20/2018 3:14 PM     HISTORY: Chest wall pain.      Impression    IMPRESSION: No evidence of displaced left rib fracture. No pleural  effusion or pneumothorax. The lungs appear clear. Thoracic spine mild  scoliosis is noted, unchanged from 1/2/2013.    BART MERCER MD       If you have any questions please call the clinic at [phone]    Sincerely,    Walter Villatoro MD  [facility]

## 2018-07-20 NOTE — MR AVS SNAPSHOT
"              After Visit Summary   2018    Amalia Miranda    MRN: [id number]           Patient Information     Date Of Birth          1960        Visit Information        Provider Department      2018 2:40 PM Walter Villatoro MD [facility]        Today's Diagnoses     Chest wall pain    -  1       Follow-ups after your visit        Who to contact     If you have questions or need follow up information about today's clinic visit or your schedule please contact [facility] directly at [phone].  Normal or non-critical lab and imaging results will be communicated to you by 15MinutesNOWhart, letter or phone within 4 business days after the clinic has received the results. If you do not hear from us within 7 days, please contact the clinic through 15MinutesNOWhart or phone. If you have a critical or abnormal lab result, we will notify you by phone as soon as possible.  Submit refill requests through [employer] or call your pharmacy and they will forward the refill request to us. Please allow 3 business days for your refill to be completed.          Additional Information About Your Visit        MyChart Information     [employer] lets you send messages to your doctor, view your test results, renew your prescriptions, schedule appointments and more. To sign up, go to [email or web address] . Click on \"Log in\" on the left side of the screen, which will take you to the Welcome page. Then click on \"Sign up Now\" on the right side of the page.     You will be asked to enter the access code listed below, as well as some personal information. Please follow the directions to create your username and password.     Your access code is: [email or web address]  Expires: 10/18/2018  2:31 PM     Your access code will  in 90 days. If you need help or a new code, please call your [facility] or [phone].        Care EveryWhere ID     This is your Care EveryWhere ID. This could be used by " other organizations to access your [place] medical records  [id number]        Your Vitals Were     Pulse Temperature Pulse Oximetry Breastfeeding? BMI (Body Mass Index)       79 98.2  F (36.8  C) (Oral) 98% No 21.12 kg/m2        Blood Pressure from Last 3 Encounters:   07/20/18 112/63   06/01/18 106/69   11/20/17 100/63    Weight from Last 3 Encounters:   07/20/18 133 lb (60.3 kg)   06/01/18 131 lb (59.4 kg)   11/20/17 123 lb (55.8 kg)               Primary Care Provider Office Phone # Fax #    Clinic [facility] [phone]       No address on file        Equal Access to Services     HILTON RANDALL : Himanshu maxo [place], waaxda luqadaha, qaybta kaalmada adeegyada, feli pang. So [facility] [phone].    ATENCIÓN: Si habla español, tiene a sow disposición servicios gratuitos de asistencia lingüística. Llame al [phone].    We comply with applicable federal civil rights laws and [place] laws. We do not discriminate on the basis of race, color, national origin, age, disability, sex, sexual orientation, or gender identity.            Thank you!     Thank you for choosing [facility]  for your care. Our goal is always to provide you with excellent care. Hearing back from our patients is one way we can continue to improve our services. Please take a few minutes to complete the written survey that you may receive in the mail after your visit with us. Thank you!             Your Updated Medication List - Protect others around you: Learn how to safely use, store and throw away your medicines at www.disposemymeds.org.          This list is accurate as of 7/20/18  3:34 PM.  Always use your most recent med list.                   Brand Name Dispense Instructions for use Diagnosis    CALTRATE 600 PO      twice daily        escitalopram 5 MG tablet    LEXAPRO    90 tablet    Take 1 tablet (5 mg) by mouth daily    COREY (generalized anxiety  disorder)       EXCEDRIN PO      as needed        IMODIUM A-D 2 MG Chew   Generic drug:  Loperamide HCl      every day        omeprazole 20 MG CR capsule    priLOSEC    90 capsule    Take 1 capsule (20 mg) by mouth daily    Gastroesophageal reflux disease, esophagitis presence not specified       Vitamin B12 3000 MCG Subl      Place 1 tablet under the tongue once a week        vitamin D 400 units capsule      2 a day

## 2023-03-24 NOTE — MR AVS SNAPSHOT
After Visit Summary   10/23/2017    Amalia Miranda    MRN: [id number]           Patient Information     Date Of Birth          1960        Visit Information        Provider Department      10/23/2017 9:00 AM Elmer Cummisn MD [facility] [place]        Care Instructions    Please remember to call and schedule a follow up appointment if one was recommended at your earliest convenience.  Orthopedics CLINIC HOURS TELEPHONE NUMBER   Dr. Placido Hui  Certified Medical Assistant   Monday & Wednesday   8am - 5pm  Thursday 1pm - 5pm  Friday 8am -11:30am Specialty schedulers:   [phone] to schedule your surgery.  Main Clinic:   [phone] to make an appointment with any provider.    Urgent Care locations:    [facility] Monday-Friday Closed  Saturday-Sunday 9am-5pm      Monday-Friday 12pm - 8pm  Saturday-Sunday 9am-5pm [phone]     If SURGERY has been recommended, please call our Specialty Schedulers at [phone] to schedule your procedure.    If you need a medication refill, please contact your pharmacy. Please allow 3 business days for your refill to be completed.    If an MRI or CT scan has been recommended, please call [place] [facility] Schedulers at [phone] to schedule your appointment.  Use [employer] (secure e-mail communication and access to your chart) to send a message or to make an appointment. Please ask how you can sign up for [employer]  Your care team's suggested websites for health information:   [email or web address] : Up to date and easily searchable information on multiple topics.   [email or web address] : [place] dept of heat[place], public health issues in [place], N1N1              Follow-ups after your visit        Your next 10 appointments already scheduled     Oct 23, 2017  9:00 AM CDT   Return Visit with MD [facility] [place] ([facility]  Lvm for pt to phone office to schedule an appointment    "[place]) [address] [address]  [place] [id number] [phone]              Who to contact     If you have questions or need follow up information about today's clinic visit or your schedule please contact [facility] [place] directly at [phone].  Normal or non-critical lab and imaging results will be communicated to you by MyChart, letter or phone within 4 business days after the clinic has received the results. If you do not hear from us within 7 days, please contact the clinic through MyChart or phone. If you have a critical or abnormal lab result, we will notify you by phone as soon as possible.  Submit refill requests through [employer] or call your pharmacy and they will forward the refill request to us. Please allow 3 business days for your refill to be completed.          Additional Information About Your Visit        MyChart Information     [employer] lets you send messages to your doctor, view your test results, renew your prescriptions, schedule appointments and more. To sign up, go to [email or web address] . Click on \"Log in\" on the left side of the screen, which will take you to the Welcome page. Then click on \"Sign up Now\" on the right side of the page.     You will be asked to enter the access code listed below, as well as some personal information. Please follow the directions to create your username and password.     Your access code is: [email or web address]  Expires: 2018  8:51 AM     Your access code will  in 90 days. If you need help or a new code, please call your [place] clinic or [phone].        Care EveryWhere ID     This is your Care EveryWhere ID. This could be used by other organizations to access your [place] medical records  [id number]        Your Vitals Were     Respirations Height BMI (Body Mass Index)             16 1.702 m (5' 7\") 19.33 kg/m2          Blood Pressure from Last 3 Encounters:   17 106/67   17 114/66 "   03/28/17 101/64    Weight from Last 3 Encounters:   10/23/17 56 kg (123 lb 6.4 oz)   07/05/17 53.1 kg (117 lb)   04/11/17 55.8 kg (123 lb)              Today, you had the following     No orders found for display       Primary Care Provider Office Phone # Fax #    Clinic [facility] [place] [phone]       No address on file        Equal Access to Services     HILTON RANDALL : Hadii aad ku hadasho Soomaali, waaxda luqadaha, qaybta kaalmada adeegyada, waxay idiin hayaan adeeg les luciana . So [facility] [phone].    ATENCIÓN: Si thomas deutsch, tiene a sow disposición servicios gratuitos de asistencia lingüística. Llame al [phone].    We comply with applicable federal civil rights laws and [place] laws. We do not discriminate on the basis of race, color, national origin, age, disability, sex, sexual orientation, or gender identity.            Thank you!     Thank you for choosing [facility]  for your care. Our goal is always to provide you with excellent care. Hearing back from our patients is one way we can continue to improve our services. Please take a few minutes to complete the written survey that you may receive in the mail after your visit with us. Thank you!             Your Updated Medication List - Protect others around you: Learn how to safely use, store and throw away your medicines at www.disposemymeds.org.          This list is accurate as of: 10/23/17  8:51 AM.  Always use your most recent med list.                   Brand Name Dispense Instructions for use Diagnosis    CALTRATE 600 PO      twice daily        ciprofloxacin 500 MG tablet    CIPRO    14 tablet    Take 1 tablet (500 mg) by mouth 2 times daily    Urinary tract infection with hematuria, site unspecified       conjugated estrogens cream    PREMARIN    30 g    Place 0.5 g vaginally twice a week    Vaginal itching       escitalopram 10 MG tablet    LEXAPRO    30 tablet    TAKE 1 TABLET (10  MG) BY MOUTH DAILY    COREY (generalized anxiety disorder)       EXCEDRIN PO      as needed        IMODIUM A-D 2 MG Chew   Generic drug:  Loperamide HCl      every day        metroNIDAZOLE 500 MG tablet    FLAGYL    14 tablet    Take 1 tablet (500 mg) by mouth 2 times daily    BV (bacterial vaginosis)       omeprazole 20 MG CR capsule    priLOSEC     Take 20 mg by mouth daily        vitamin D 400 UNITS capsule      2 a day

## 2024-10-10 ENCOUNTER — OFFICE VISIT (OUTPATIENT)
Dept: FAMILY MEDICINE | Facility: CLINIC | Age: 64
End: 2024-10-10
Payer: COMMERCIAL

## 2024-10-10 VITALS
DIASTOLIC BLOOD PRESSURE: 66 MMHG | BODY MASS INDEX: 21.66 KG/M2 | OXYGEN SATURATION: 100 % | TEMPERATURE: 98.1 F | RESPIRATION RATE: 16 BRPM | HEIGHT: 66 IN | WEIGHT: 134.8 LBS | HEART RATE: 69 BPM | SYSTOLIC BLOOD PRESSURE: 107 MMHG

## 2024-10-10 DIAGNOSIS — Z12.31 VISIT FOR SCREENING MAMMOGRAM: ICD-10-CM

## 2024-10-10 DIAGNOSIS — M81.0 AGE-RELATED OSTEOPOROSIS WITHOUT CURRENT PATHOLOGICAL FRACTURE: ICD-10-CM

## 2024-10-10 DIAGNOSIS — E89.0 S/P PARTIAL THYROIDECTOMY: ICD-10-CM

## 2024-10-10 DIAGNOSIS — F32.9 MAJOR DEPRESSIVE DISORDER, REMISSION STATUS UNSPECIFIED, UNSPECIFIED WHETHER RECURRENT: ICD-10-CM

## 2024-10-10 DIAGNOSIS — Z78.0 POSTMENOPAUSAL STATUS: Chronic | ICD-10-CM

## 2024-10-10 DIAGNOSIS — K21.00 GASTROESOPHAGEAL REFLUX DISEASE WITH ESOPHAGITIS, UNSPECIFIED WHETHER HEMORRHAGE: ICD-10-CM

## 2024-10-10 DIAGNOSIS — E78.2 MIXED HYPERLIPIDEMIA: ICD-10-CM

## 2024-10-10 DIAGNOSIS — E89.0 POSTOPERATIVE HYPOTHYROIDISM: ICD-10-CM

## 2024-10-10 DIAGNOSIS — K58.0 IRRITABLE BOWEL SYNDROME WITH DIARRHEA: ICD-10-CM

## 2024-10-10 DIAGNOSIS — E06.3 HASHIMOTO'S THYROIDITIS: ICD-10-CM

## 2024-10-10 DIAGNOSIS — E04.2 GOITER, NONTOXIC, MULTINODULAR: ICD-10-CM

## 2024-10-10 DIAGNOSIS — Z00.00 ENCOUNTER FOR MEDICARE ANNUAL WELLNESS EXAM: Primary | ICD-10-CM

## 2024-10-10 PROBLEM — E78.5 HYPERLIPIDEMIA LDL GOAL <100: Status: RESOLVED | Noted: 2018-06-01 | Resolved: 2024-10-10

## 2024-10-10 LAB
ERYTHROCYTE [DISTWIDTH] IN BLOOD BY AUTOMATED COUNT: 12 % (ref 10–15)
HCT VFR BLD AUTO: 42.9 % (ref 35–47)
HGB BLD-MCNC: 13.9 G/DL (ref 11.7–15.7)
MCH RBC QN AUTO: 29.7 PG (ref 26.5–33)
MCHC RBC AUTO-ENTMCNC: 32.4 G/DL (ref 31.5–36.5)
MCV RBC AUTO: 92 FL (ref 78–100)
PLATELET # BLD AUTO: 223 10E3/UL (ref 150–450)
RBC # BLD AUTO: 4.68 10E6/UL (ref 3.8–5.2)
WBC # BLD AUTO: 5 10E3/UL (ref 4–11)

## 2024-10-10 PROCEDURE — 90677 PCV20 VACCINE IM: CPT | Performed by: FAMILY MEDICINE

## 2024-10-10 PROCEDURE — 80061 LIPID PANEL: CPT | Performed by: FAMILY MEDICINE

## 2024-10-10 PROCEDURE — 99204 OFFICE O/P NEW MOD 45 MIN: CPT | Mod: 25 | Performed by: FAMILY MEDICINE

## 2024-10-10 PROCEDURE — 84439 ASSAY OF FREE THYROXINE: CPT | Performed by: FAMILY MEDICINE

## 2024-10-10 PROCEDURE — G0402 INITIAL PREVENTIVE EXAM: HCPCS | Performed by: FAMILY MEDICINE

## 2024-10-10 PROCEDURE — 36415 COLL VENOUS BLD VENIPUNCTURE: CPT | Performed by: FAMILY MEDICINE

## 2024-10-10 PROCEDURE — 85027 COMPLETE CBC AUTOMATED: CPT | Performed by: FAMILY MEDICINE

## 2024-10-10 PROCEDURE — 80053 COMPREHEN METABOLIC PANEL: CPT | Performed by: FAMILY MEDICINE

## 2024-10-10 PROCEDURE — 84443 ASSAY THYROID STIM HORMONE: CPT | Performed by: FAMILY MEDICINE

## 2024-10-10 PROCEDURE — G0009 ADMIN PNEUMOCOCCAL VACCINE: HCPCS | Performed by: FAMILY MEDICINE

## 2024-10-10 RX ORDER — ONDANSETRON 4 MG/1
4 TABLET, FILM COATED ORAL 3 TIMES DAILY PRN
Qty: 90 TABLET | Refills: 1 | Status: SHIPPED | OUTPATIENT
Start: 2024-10-10

## 2024-10-10 RX ORDER — ESCITALOPRAM OXALATE 5 MG/1
5 TABLET ORAL DAILY
Qty: 90 TABLET | Refills: 3 | Status: SHIPPED | OUTPATIENT
Start: 2024-10-10

## 2024-10-10 SDOH — HEALTH STABILITY: PHYSICAL HEALTH: ON AVERAGE, HOW MANY DAYS PER WEEK DO YOU ENGAGE IN MODERATE TO STRENUOUS EXERCISE (LIKE A BRISK WALK)?: 5 DAYS

## 2024-10-10 ASSESSMENT — SOCIAL DETERMINANTS OF HEALTH (SDOH): HOW OFTEN DO YOU GET TOGETHER WITH FRIENDS OR RELATIVES?: ONCE A WEEK

## 2024-10-10 NOTE — PATIENT INSTRUCTIONS
Patient Education   Preventive Care Advice   This is general advice given by our system to help you stay healthy. However, your care team may have specific advice just for you. Please talk to your care team about your preventive care needs.  Nutrition  Eat 5 or more servings of fruits and vegetables each day.  Try wheat bread, brown rice and whole grain pasta (instead of white bread, rice, and pasta).  Get enough calcium and vitamin D. Check the label on foods and aim for 100% of the RDA (recommended daily allowance).  Lifestyle  Exercise at least 150 minutes each week  (30 minutes a day, 5 days a week).  Do muscle strengthening activities 2 days a week. These help control your weight and prevent disease.  No smoking.  Wear sunscreen to prevent skin cancer.  Have a dental exam and cleaning every 6 months.  Yearly exams  See your health care team every year to talk about:  Any changes in your health.  Any medicines your care team has prescribed.  Preventive care, family planning, and ways to prevent chronic diseases.  Shots (vaccines)   HPV shots (up to age 26), if you've never had them before.  Hepatitis B shots (up to age 59), if you've never had them before.  COVID-19 shot: Get this shot when it's due.  Flu shot: Get a flu shot every year.  Tetanus shot: Get a tetanus shot every 10 years.  Pneumococcal, hepatitis A, and RSV shots: Ask your care team if you need these based on your risk.  Shingles shot (for age 50 and up)  General health tests  Diabetes screening:  Starting at age 35, Get screened for diabetes at least every 3 years.  If you are younger than age 35, ask your care team if you should be screened for diabetes.  Cholesterol test: At age 39, start having a cholesterol test every 5 years, or more often if advised.  Bone density scan (DEXA): At age 50, ask your care team if you should have this scan for osteoporosis (brittle bones).  Hepatitis C: Get tested at least once in your life.  STIs (sexually  transmitted infections)  Before age 24: Ask your care team if you should be screened for STIs.  After age 24: Get screened for STIs if you're at risk. You are at risk for STIs (including HIV) if:  You are sexually active with more than one person.  You don't use condoms every time.  You or a partner was diagnosed with a sexually transmitted infection.  If you are at risk for HIV, ask about PrEP medicine to prevent HIV.  Get tested for HIV at least once in your life, whether you are at risk for HIV or not.  Cancer screening tests  Cervical cancer screening: If you have a cervix, begin getting regular cervical cancer screening tests starting at age 21.  Breast cancer scan (mammogram): If you've ever had breasts, begin having regular mammograms starting at age 40. This is a scan to check for breast cancer.  Colon cancer screening: It is important to start screening for colon cancer at age 45.  Have a colonoscopy test every 10 years (or more often if you're at risk) Or, ask your provider about stool tests like a FIT test every year or Cologuard test every 3 years.  To learn more about your testing options, visit:   .  For help making a decision, visit:   https://bit.ly/xd07171.  Prostate cancer screening test: If you have a prostate, ask your care team if a prostate cancer screening test (PSA) at age 55 is right for you.  Lung cancer screening: If you are a current or former smoker ages 50 to 80, ask your care team if ongoing lung cancer screenings are right for you.  For informational purposes only. Not to replace the advice of your health care provider. Copyright   2023 [employer]. All rights reserved. Clinically reviewed by the [facility] Transitions Program. [employer] [id number] - REV 01/24.

## 2024-10-10 NOTE — PROGRESS NOTES
Preventive Care Visit  [facility]  Stefania Bingham MD, Family Medicine  Oct 10, 2024      Assessment & Plan     Encounter for Medicare annual wellness exam  Routine Medicare wellness visit, updated in EMR.  Fasting labs updated as below.  Pneumococcal vaccine updated today, patient declines flu and COVID booster.  Mammogram discussed and ordered.  Patient completed FIT testing in February 2024.  She is due for updated bone density testing, ordered today.  Plan next routine well visit in 1 year.    Irritable bowel syndrome with diarrhea  Patient may be interested in trying Lotronex again despite previous expense.  Researched that an alternative can be ondansetron, up to 8 mg 3 times daily.  Patient will start with 4 mg once daily and slowly increase if needed.  If not doing well with this, she will let me know and will place a referral back to GI to discuss Lotronex or other options.  - ondansetron (ZOFRAN) 4 MG tablet; Take 1 tablet (4 mg) by mouth 3 times daily as needed for other (IBS-D).  - Comprehensive metabolic panel (BMP + Alb, Alk Phos, ALT, AST, Total. Bili, TP)  - CBC with platelets    Gastroesophageal reflux disease with esophagitis, unspecified whether hemorrhage  Refills provided for omeprazole, which has been working well.  - omeprazole (PRILOSEC) 20 MG DR capsule; Take 1 capsule (20 mg) by mouth daily.  - Comprehensive metabolic panel (BMP + Alb, Alk Phos, ALT, AST, Total. Bili, TP)  - CBC with platelets    Age-related osteoporosis without current pathological fracture  Previous bone density test from 2019 showed osteoporosis.  Patient states that she was not made aware of this or offered treatment.  Plan recheck bone density and will discuss options from there.  - DX Bone Density; Future  - Comprehensive metabolic panel (BMP + Alb, Alk Phos, ALT, AST, Total. Bili, TP)  - CBC with platelets    Postmenopausal status  Due for updated bone density testing.  - DX Bone Density;  Future    Major depressive disorder, remission status unspecified, unspecified whether recurrent  Patient would like to restart Lexapro.  She reports ongoing symptoms of anxiety primarily since stopping.  - escitalopram (LEXAPRO) 5 MG tablet; Take 1 tablet (5 mg) by mouth daily.  - Comprehensive metabolic panel (BMP + Alb, Alk Phos, ALT, AST, Total. Bili, TP)  - CBC with platelets    Hashimoto's thyroiditis  Goiter, nontoxic, multinodular  S/P partial thyroidectomy  Postoperative hypothyroidism  TSH is elevated and free T4 mildly low.  Recommend low-dose supplementation with recheck levels in about 2 months.  - levothyroxine (SYNTHROID/LEVOTHROID) 25 MCG tablet; Take 1 tablet (25 mcg) by mouth daily.    Mixed hyperlipidemia  LDL is extremely high.  Recommended another trial of statin, and will start with low dose Crestor.  If not tolerated, patient would benefit from discussion with cardiology about PCSK9 inhibitor.  - Comprehensive metabolic panel (BMP + Alb, Alk Phos, ALT, AST, Total. Bili, TP)  - Lipid panel reflex to direct LDL Fasting  - rosuvastatin (CRESTOR) 5 MG tablet; Take 1 tablet (5 mg) by mouth daily.    Visit for screening mammogram  Overdue for screening mammogram, ordered today.  - MA Screen Bilateral w/Elmo; Future            Counseling  Appropriate preventive services were addressed with this patient via screening, questionnaire, or discussion as appropriate for fall prevention, nutrition, physical activity, Tobacco-use cessation, social engagement, weight loss and cognition.  Checklist reviewing preventive services available has been given to the patient.  Reviewed patient's diet, addressing concerns and/or questions.           Sanjana Holland is a 64 year old, presenting for the following:  Wellness Visit        10/10/2024     2:00 PM   Additional Questions   Roomed by Orly MANLEY LPN        Health Care Directive  Patient does not have a Health Care Directive or Living Will: Discussed advance care  "planning with patient; information given to patient to review.    HPI    Has IBS-D and takes a \"handful\" of imodium in the mornings.  Seemed to start after having food poisoning.  Has seen GI in the past and was prescribed Lotronex.  Generic didn't help, and brand name was extremely expensive.  Took this medication many years ago.    Has a history of depression and anxiety, and feels that Lexapro was helpful for her IBS a bit also.  Would like to restart.  Needs a refill of omeprazole for GERD.  No symptoms if taking this medication regularly.            10/10/2024   General Health   How would you rate your overall physical health? Good   Feel stress (tense, anxious, or unable to sleep) To some extent      (!) STRESS CONCERN      10/10/2024   Nutrition   Diet: Regular (no restrictions)    Breakfast skipped       Multiple values from one day are sorted in reverse-chronological order         10/10/2024   Exercise   Days per week of moderate/strenous exercise 5 days            10/10/2024   Social Factors   Frequency of gathering with friends or relatives Once a week   Worry food won't last until get money to buy more No   Food not last or not have enough money for food? No   Do you have housing? (Housing is defined as stable permanent housing and does not include staying ouside in a car, in a tent, in an abandoned building, in an overnight shelter, or couch-surfing.) Yes   Are you worried about losing your housing? No   Lack of transportation? No   Unable to get utilities (heat,electricity)? No            10/10/2024   Fall Risk   Fallen 2 or more times in the past year? No   Trouble with walking or balance? No             10/10/2024   Activities of Daily Living- Home Safety   Needs help with the following daily activites None of the above   Safety concerns in the home None of the above            10/10/2024   Dental   Dentist two times every year? Yes            10/10/2024   Hearing Screening   Hearing concerns? None " of the above            10/10/2024   Driving Risk Screening   Patient/family members have concerns about driving No            10/10/2024   General Alertness/Fatigue Screening   Have you been more tired than usual lately? No            10/10/2024   Urinary Incontinence Screening   Bothered by leaking urine in past 6 months No            10/10/2024   TB Screening   Were you born outside of the [place]? No            Today's PHQ-2 Score:       10/10/2024     1:46 PM   PHQ-2 ( 1999 Pf[employer])   Q1: Little interest or pleasure in doing things 0   Q2: Feeling down, depressed or hopeless 1   PHQ-2 Score 1   Q1: Little interest or pleasure in doing things Not at all   Q2: Feeling down, depressed or hopeless Several days   PHQ-2 Score 1           10/10/2024   Substance Use   Alcohol more than 3/day or more than 7/wk Not Applicable   Do you have a current opioid prescription? No   How severe/bad is pain from 1 to 10? 0/10 (No Pain)   Do you use any other substances recreationally? No        Social History     Tobacco Use    Smoking status: Never    Smokeless tobacco: Never   Substance Use Topics    Alcohol use: No    Drug use: No          Mammogram Screening - Mammogram every 1-2 years updated in Health Maintenance based on mutual decision making      History of abnormal Pap smear: No - age 65 or older with adequate negative prior screening test results (3 consecutive negative cytology results, 2 consecutive negative cotesting results, or 2 consecutive negative HrHPV test results within 10 years, with the most recent test occurring within the recommended screening interval for the test used)        Latest Ref Rng & Units 8/4/2016     8:10 AM   PAP / HPV   PAP (Historical)  NIL    HPV 16 DNA NEG Negative    HPV 18 DNA NEG Negative    Other HR HPV NEG Negative      ASCVD Risk   The ASCVD Risk score (Herberth WOOD, et al., 2019) failed to calculate for the following reasons:    Cannot find a previous HDL lab    Cannot find a  previous total cholesterol lab            Reviewed and updated as needed this visit by Provider   Tobacco  Allergies  Meds  Problems               Past Medical History:   Diagnosis Date    Allergies     Anxiety     Depression     Deviated septum 01/01/2000    Distal radius fracture 01/31/2013    GERD (gastroesophageal reflux disease)     Goiter, nontoxic, multinodular     Hemorrhoids     IBS (irritable bowel syndrome)     OCD (obsessive compulsive disorder)     S/p bilateral carpal tunnel release 01/14/2016    Stress headaches     Thyroid nodule     2005     Past Surgical History:   Procedure Laterality Date    CLOSED REDUCTION, PERCUTANEOUS PINNING WRIST, COMBINED  2/4/13    left    COLONOSCOPY  1999    ENT SURGERY  9/13/16    Lower lip bx. Dr. Olsen    RELEASE CARPAL TUNNEL Left 12/10/2015    Procedure: RELEASE CARPAL TUNNEL;  Surgeon: Elmer Cummins MD;  Location: [facility] OR    RELEASE CARPAL TUNNEL Right 12/31/2015    Procedure: RELEASE CARPAL TUNNEL;  Surgeon: Elmer Cummins MD;  Location: [facility] OR    SEPTOPLASTY  06/05/2000    THYROIDECTOMY  AGE 13    PARTIAL     OB History   No obstetric history on file.     BP Readings from Last 3 Encounters:   10/10/24 107/66   07/20/18 112/63   06/01/18 106/69    Wt Readings from Last 3 Encounters:   10/10/24 61.1 kg (134 lb 12.8 oz)   07/20/18 60.3 kg (133 lb)   06/01/18 59.4 kg (131 lb)                  Patient Active Problem List   Diagnosis    Dizziness    Panic attack    Anxiety    OCD (obsessive compulsive disorder)    Panic disorder with agoraphobia    Major depression in complete remission (H)    Knee pain    Hashimoto's thyroiditis    Goiter, nontoxic, multinodular    Menopausal vaginal dryness    Irritable bowel syndrome with diarrhea    S/P partial thyroidectomy    Reflux esophagitis    Age-related osteoporosis without current pathological fracture    Internal hemorrhoids with complication    Mixed hyperlipidemia     Past Surgical History:   Procedure  Laterality Date    CLOSED REDUCTION, PERCUTANEOUS PINNING WRIST, COMBINED  2/4/13    left    COLONOSCOPY  1999    ENT SURGERY  9/13/16    Lower lip bx. Dr. Olsen    RELEASE CARPAL TUNNEL Left 12/10/2015    Procedure: RELEASE CARPAL TUNNEL;  Surgeon: Elmer Cummins MD;  Location: [facility] OR    RELEASE CARPAL TUNNEL Right 12/31/2015    Procedure: RELEASE CARPAL TUNNEL;  Surgeon: Elmer Cummins MD;  Location: [facility] OR    SEPTOPLASTY  06/05/2000    THYROIDECTOMY  AGE 13    PARTIAL       Social History     Tobacco Use    Smoking status: Never    Smokeless tobacco: Never   Substance Use Topics    Alcohol use: No     Family History   Problem Relation Age of Onset    Hypertension Mother     Alcohol/Drug Mother     Arthritis Mother     Depression Mother     Dementia Mother     Diabetes Father     Diabetes Maternal Grandmother     Arthritis Maternal Grandmother     Unknown/Adopted Maternal Grandfather     Unknown/Adopted Paternal Grandmother     Unknown/Adopted Paternal Grandfather     Hypertension Sister     Allergies Sister     Allergies Son     Asthma Daughter     Allergies Daughter     Respiratory Daughter     Depression Sister     Gynecology Sister     Musculoskeletal Disorder Sister     Psychotic Disorder Sister     Thyroid Disease Sister          Current Outpatient Medications   Medication Sig Dispense Refill    CALTRATE 600 OR twice daily      Cyanocobalamin (VITAMIN B12) 3000 MCG SUBL Place 1 tablet under the tongue once a week      escitalopram (LEXAPRO) 5 MG tablet Take 1 tablet (5 mg) by mouth daily. 90 tablet 3    EXCEDRIN OR as needed      IMODIUM A-D 2 MG OR CHEW every day      levothyroxine (SYNTHROID/LEVOTHROID) 25 MCG tablet Take 1 tablet (25 mcg) by mouth daily. 90 tablet 0    omeprazole (PRILOSEC) 20 MG DR capsule Take 1 capsule (20 mg) by mouth daily. 90 capsule 3    ondansetron (ZOFRAN) 4 MG tablet Take 1 tablet (4 mg) by mouth 3 times daily as needed for other (IBS-D). 90 tablet 1    rosuvastatin  "(CRESTOR) 5 MG tablet Take 1 tablet (5 mg) by mouth daily. 30 tablet 1    VITAMIN D 400 UNIT OR CAPS 2 a day       Allergies   Allergen Reactions    Atorvastatin Nausea    Influenza Virus Vaccine Nausea and Vomiting     Current providers sharing in care for this patient include:  Patient Care Team:  Stefania Bingham MD as PCP - General (Family Medicine)    The following health maintenance items are reviewed in Epic and correct as of today:  Health Maintenance   Topic Date Due    DEXA  Never done    ANNUAL REVIEW OF HM ORDERS  Never done    HIV SCREENING  Never done    PHQ-9  12/01/2018    COLORECTAL CANCER SCREENING  12/03/2018    GLUCOSE  08/04/2019    LIPID  08/04/2021    MEDICARE ANNUAL WELLNESS VISIT  02/19/2024    Pneumococcal Vaccine: 65+ Years (1 of 1 - PCV) Never done    INFLUENZA VACCINE (1) Never done    COVID-19 Vaccine (1 - 2024-25 season) Never done    MAMMO SCREENING  08/31/2024    FALL RISK ASSESSMENT  10/10/2025    ADVANCE CARE PLANNING  10/10/2029    DTAP/TDAP/TD IMMUNIZATION (4 - Td or Tdap) 07/23/2030    RSV VACCINE (1 - 1-dose 75+ series) 02/19/2034    HEPATITIS C SCREENING  Completed    DEPRESSION ACTION PLAN  Completed    ZOSTER IMMUNIZATION  Completed    HPV IMMUNIZATION  Aged Out    MENINGITIS IMMUNIZATION  Aged Out    RSV MONOCLONAL ANTIBODY  Aged Out    PAP  Discontinued         Review of Systems  Constitutional, HEENT, cardiovascular, pulmonary, GI, , musculoskeletal, neuro, skin, endocrine and psych systems are negative, except as otherwise noted.     Objective    Exam  /66   Pulse 69   Temp 98.1  F (36.7  C) (Oral)   Resp 16   Ht 1.68 m (5' 6.14\")   Wt 61.1 kg (134 lb 12.8 oz)   SpO2 100%   BMI 21.66 kg/m     Estimated body mass index is 21.66 kg/m  as calculated from the following:    Height as of this encounter: 1.68 m (5' 6.14\").    Weight as of this encounter: 61.1 kg (134 lb 12.8 oz).    Physical Exam  GENERAL: alert and no distress  EYES: Eyes grossly normal to " inspection, PERRL and conjunctivae and sclerae normal  HENT: ear canals and TM's normal, nose and mouth without ulcers or lesions  NECK: no adenopathy, no asymmetry, masses, or scars  RESP: lungs clear to auscultation - no rales, rhonchi or wheezes  BREAST: normal without masses, tenderness or nipple discharge and no palpable axillary masses or adenopathy  CV: regular rate and rhythm, normal S1 S2, no S3 or S4, no murmur, click or rub, no peripheral edema  ABDOMEN: soft, nontender, no hepatosplenomegaly, no masses and bowel sounds normal  MS: no gross musculoskeletal defects noted, no edema  SKIN: no suspicious lesions or rashes  NEURO: Normal strength and tone, mentation intact and speech normal  PSYCH: mentation appears normal, affect normal/bright         10/10/2024   Mini Cog   Clock Draw Score 2 Normal   3 Item Recall 3 objects recalled   Mini Cog Total Score 5            Vision Screen  Vision Screen Results: Pass      Prior to immunization administration, verified patients identity using patient s name and date of birth. Please see Immunization Activity for additional information.     Screening Questionnaire for Adult Immunization    Are you sick today?   No   Do you have allergies to medications, food, a vaccine component or latex?   No   Have you ever had a serious reaction after receiving a vaccination?   No   Do you have a long-term health problem with heart, lung, kidney, or metabolic disease (e.g., diabetes), asthma, a blood disorder, no spleen, complement component deficiency, a cochlear implant, or a spinal fluid leak?  Are you on long-term aspirin therapy?   No   Do you have cancer, leukemia, HIV/AIDS, or any other immune system problem?   No   Do you have a parent, brother, or sister with an immune system problem?   No   In the past 3 months, have you taken medications that affect  your immune system, such as prednisone, other steroids, or anticancer drugs; drugs for the treatment of rheumatoid  arthritis, Crohn s disease, or psoriasis; or have you had radiation treatments?   No   Have you had a seizure, or a brain or other nervous system problem?   No   During the past year, have you received a transfusion of blood or blood    products, or been given immune (gamma) globulin or antiviral drug?   No   For women: Are you pregnant or is there a chance you could become       pregnant during the next month?   No   Have you received any vaccinations in the past 4 weeks?   No   Immunization questionnaire answers were all negative.  Patient instructed to remain in clinic for 15 minutes afterwards, and to report any adverse reactions. Screening performed by Winter Corcoran on 10/10/2024 at 2:55 PM.      Signed Electronically by: Stefania Bingham MD

## 2024-10-11 ENCOUNTER — TELEPHONE (OUTPATIENT)
Dept: FAMILY MEDICINE | Facility: CLINIC | Age: 64
End: 2024-10-11
Payer: COMMERCIAL

## 2024-10-11 PROBLEM — E78.2 MIXED HYPERLIPIDEMIA: Status: ACTIVE | Noted: 2024-10-11

## 2024-10-11 LAB
ALBUMIN SERPL BCG-MCNC: 4.5 G/DL (ref 3.5–5.2)
ALP SERPL-CCNC: 48 U/L (ref 40–150)
ALT SERPL W P-5'-P-CCNC: 12 U/L (ref 0–50)
ANION GAP SERPL CALCULATED.3IONS-SCNC: 11 MMOL/L (ref 7–15)
AST SERPL W P-5'-P-CCNC: 12 U/L (ref 0–45)
BILIRUB SERPL-MCNC: 0.4 MG/DL
BUN SERPL-MCNC: 12.6 MG/DL (ref 8–23)
CALCIUM SERPL-MCNC: 9.7 MG/DL (ref 8.8–10.4)
CHLORIDE SERPL-SCNC: 101 MMOL/L (ref 98–107)
CHOLEST SERPL-MCNC: 347 MG/DL
CREAT SERPL-MCNC: 0.87 MG/DL (ref 0.51–0.95)
EGFRCR SERPLBLD CKD-EPI 2021: 74 ML/MIN/1.73M2
FASTING STATUS PATIENT QL REPORTED: YES
FASTING STATUS PATIENT QL REPORTED: YES
GLUCOSE SERPL-MCNC: 94 MG/DL (ref 70–99)
HCO3 SERPL-SCNC: 26 MMOL/L (ref 22–29)
HDLC SERPL-MCNC: 47 MG/DL
LDLC SERPL CALC-MCNC: 234 MG/DL
NONHDLC SERPL-MCNC: 300 MG/DL
POTASSIUM SERPL-SCNC: 4 MMOL/L (ref 3.4–5.3)
PROT SERPL-MCNC: 7.7 G/DL (ref 6.4–8.3)
SODIUM SERPL-SCNC: 138 MMOL/L (ref 135–145)
T4 FREE SERPL-MCNC: 0.7 NG/DL (ref 0.9–1.7)
TRIGL SERPL-MCNC: 331 MG/DL
TSH SERPL DL<=0.005 MIU/L-ACNC: 4.21 UIU/ML (ref 0.3–4.2)

## 2024-10-11 RX ORDER — ROSUVASTATIN CALCIUM 5 MG/1
5 TABLET, COATED ORAL DAILY
Qty: 30 TABLET | Refills: 1 | Status: SHIPPED | OUTPATIENT
Start: 2024-10-11

## 2024-10-11 RX ORDER — LEVOTHYROXINE SODIUM 25 UG/1
25 TABLET ORAL DAILY
Qty: 90 TABLET | Refills: 0 | Status: SHIPPED | OUTPATIENT
Start: 2024-10-11

## 2024-10-11 NOTE — TELEPHONE ENCOUNTER
Called patient and relayed information/instructions from provider. Patient has no further questions at this time and will follow up as needed/instructed. Assisted with setting up office visit with PCP in January.    Tarun Nuñez RN     [facility]    ----- Message from Stefania Bingham sent at 10/11/2024 11:04 AM CDT -----  I should also add, I should see her back in 2-3 months to recheck thyroid and cholesterol labs.  JENNIFER Bingham MD  10/11/2024 11:02 AM CDT       Please call patient:  Amalia,  Your labs show that your cholesterol is VERY high, and this is a high risk factor for heart disease or stroke.  We need to try another cholesterol medication, and if you don't tolerate, we can have you meet with cardiology to discuss other options.  I will send a cholesterol medication to your pharmacy to take at bedtime.  Please let me know if you have side effects.  Your thyroid hormone level is also low, and you would benefit from a low dose of thyroid supplement which I will also send to pharmacy.  Blood counts, liver and kidney function are normal.  HAZEL Bingham MD

## 2025-02-27 ENCOUNTER — PATIENT OUTREACH (OUTPATIENT)
Dept: CARE COORDINATION | Facility: CLINIC | Age: 65
End: 2025-02-27
Payer: COMMERCIAL